# Patient Record
Sex: FEMALE | Race: WHITE | Employment: OTHER | URBAN - NONMETROPOLITAN AREA
[De-identification: names, ages, dates, MRNs, and addresses within clinical notes are randomized per-mention and may not be internally consistent; named-entity substitution may affect disease eponyms.]

---

## 2021-06-28 ENCOUNTER — HOSPITAL ENCOUNTER (OUTPATIENT)
Dept: CT IMAGING | Age: 71
Discharge: HOME | End: 2021-06-28

## 2021-06-28 DIAGNOSIS — Z00.6 EXAMINATION FOR NORMAL COMPARISON FOR CLINICAL RESEARCH: ICD-10-CM

## 2021-06-30 ENCOUNTER — OFFICE VISIT (OUTPATIENT)
Dept: SURGERY | Age: 71
End: 2021-06-30
Payer: MEDICARE

## 2021-06-30 VITALS
OXYGEN SATURATION: 94 % | BODY MASS INDEX: 25.74 KG/M2 | RESPIRATION RATE: 18 BRPM | HEART RATE: 62 BPM | HEIGHT: 64 IN | SYSTOLIC BLOOD PRESSURE: 118 MMHG | TEMPERATURE: 96.5 F | WEIGHT: 150.8 LBS | DIASTOLIC BLOOD PRESSURE: 62 MMHG

## 2021-06-30 DIAGNOSIS — K63.5 POLYP OF SIGMOID COLON, UNSPECIFIED TYPE: Primary | ICD-10-CM

## 2021-06-30 PROCEDURE — 1090F PRES/ABSN URINE INCON ASSESS: CPT | Performed by: SURGERY

## 2021-06-30 PROCEDURE — 3017F COLORECTAL CA SCREEN DOC REV: CPT | Performed by: SURGERY

## 2021-06-30 PROCEDURE — G8417 CALC BMI ABV UP PARAM F/U: HCPCS | Performed by: SURGERY

## 2021-06-30 PROCEDURE — G8427 DOCREV CUR MEDS BY ELIG CLIN: HCPCS | Performed by: SURGERY

## 2021-06-30 PROCEDURE — 1036F TOBACCO NON-USER: CPT | Performed by: SURGERY

## 2021-06-30 PROCEDURE — G8400 PT W/DXA NO RESULTS DOC: HCPCS | Performed by: SURGERY

## 2021-06-30 PROCEDURE — 4040F PNEUMOC VAC/ADMIN/RCVD: CPT | Performed by: SURGERY

## 2021-06-30 PROCEDURE — 99204 OFFICE O/P NEW MOD 45 MIN: CPT | Performed by: SURGERY

## 2021-06-30 PROCEDURE — 1123F ACP DISCUSS/DSCN MKR DOCD: CPT | Performed by: SURGERY

## 2021-06-30 RX ORDER — FAMOTIDINE 20 MG/1
20 TABLET, FILM COATED ORAL DAILY
COMMUNITY

## 2021-06-30 RX ORDER — ESOMEPRAZOLE MAGNESIUM 40 MG/1
40 FOR SUSPENSION ORAL DAILY
COMMUNITY

## 2021-06-30 RX ORDER — METRONIDAZOLE 500 MG/1
TABLET ORAL
Qty: 3 TABLET | Refills: 0 | Status: ON HOLD | OUTPATIENT
Start: 2021-06-30 | End: 2021-07-24 | Stop reason: HOSPADM

## 2021-07-01 ASSESSMENT — ENCOUNTER SYMPTOMS
EYE DISCHARGE: 0
DIARRHEA: 0
SINUS PRESSURE: 0
TROUBLE SWALLOWING: 0
ANAL BLEEDING: 0
COUGH: 0
SHORTNESS OF BREATH: 0
BACK PAIN: 0
COLOR CHANGE: 0
ABDOMINAL PAIN: 0
WHEEZING: 0
CHOKING: 0
RECTAL PAIN: 0
EYE PAIN: 0
EYE ITCHING: 0
ABDOMINAL DISTENTION: 0
RHINORRHEA: 0
VOMITING: 0
APNEA: 0
FACIAL SWELLING: 0
EYE REDNESS: 0
ALLERGIC/IMMUNOLOGIC NEGATIVE: 1
CHEST TIGHTNESS: 0
VOICE CHANGE: 0
NAUSEA: 0
SORE THROAT: 0
BLOOD IN STOOL: 0
CONSTIPATION: 0
PHOTOPHOBIA: 0
STRIDOR: 0

## 2021-07-01 NOTE — PROGRESS NOTES
Murtaza Sarabia (:  1950)     ASSESSMENT:  1. 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 Schedule Veronica William for robotic sigmoid colectomy. 2. She will undergo pre-operative clearance per anesthesia guidelines with risk factors listed under the past medical history diagnosis & problem list.  3. The risks, benefits and alternatives were discussed with Veronica William including non-operative management. The pros and cons of robotic, laparoscopic and open techniques were discussed. All questions answered. She understands and wishes to proceed with surgical intervention. 4. Restrictions discussed with Veronica William and she expresses understanding. 5. She is advised to call back directly if there are further questions/concerns, or if her symptoms worsen prior to surgery. 6.  Obtain previous pathology results prior to surgical intervention    SUBJECTIVE/OBJECTIVE:    Chief Complaint   Patient presents with    Surgical Consult     New patient-referred by Dr Yvonne Adkins colon resection     HPI  Veronica William is a 71-year-old female who presents for initial evaluation secondary to a unresectable recurrent flat polyp in the sigmoid colon. She is underwent 3 colonoscopies since January. The area of concern has been tattooed. Pathology has demonstrated high-grade dysplasia. She is in need of formal resection. She has a history of iron deficiency anemia. Has seen Dr. Kim Hernandes for iron infusions. Long-term GERD. Takes Nexium. 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 Also has had midline open hysterectomy and . Remote history of appendectomy and cholecystectomy. She denies any generalized abdominal pain. No significant bloating/distention. Tolerating diet. Denies nausea or vomiting. No lightheadedness or dizziness. No new chest pain or shortness of breath. History of upper endoscopy demonstrating no acute concerning findings.   The polyp that has been removed is around 1-2 cm in diameter. Denies any significant family history of colorectal disease that she is aware of. Review of Systems   Constitutional: Negative for activity change, appetite change, chills, diaphoresis, fatigue, fever and unexpected weight change. HENT: Negative for congestion, dental problem, drooling, ear discharge, ear pain, facial swelling, hearing loss, mouth sores, nosebleeds, postnasal drip, rhinorrhea, sinus pressure, sneezing, sore throat, tinnitus, trouble swallowing and voice change. Eyes: Negative for photophobia, pain, discharge, redness, itching and visual disturbance. Respiratory: Negative for apnea, cough, choking, chest tightness, shortness of breath, wheezing and stridor. Cardiovascular: Negative for chest pain, palpitations and leg swelling. Gastrointestinal: Negative for abdominal distention, abdominal pain, anal bleeding, blood in stool, constipation, diarrhea, nausea, rectal pain and vomiting. Endocrine: Negative. Genitourinary: Negative for decreased urine volume, difficulty urinating, dyspareunia, dysuria, enuresis, flank pain, frequency, genital sores, hematuria, menstrual problem, pelvic pain, urgency, vaginal bleeding, vaginal discharge and vaginal pain. Musculoskeletal: Negative for arthralgias, back pain, gait problem, joint swelling, myalgias, neck pain and neck stiffness. Skin: Negative for color change, pallor, rash and wound. Allergic/Immunologic: Negative. Neurological: Negative for dizziness, tremors, seizures, syncope, facial asymmetry, speech difficulty, weakness, light-headedness, numbness and headaches. Hematological: Negative for adenopathy. Does not bruise/bleed easily. Psychiatric/Behavioral: Negative for agitation, behavioral problems, confusion, decreased concentration, dysphoric mood, hallucinations, self-injury, sleep disturbance and suicidal ideas.  The patient is not nervous/anxious and is not hyperactive. Past Medical History:   Diagnosis Date    Colon stricture ([address]     polyp    Iron deficiency     sees Dr Dhara Villanueva       Past Surgical History:   Procedure Laterality Date     SECTION      x1    CHOLECYSTECTOMY      COLONOSCOPY  2021    COLONOSCOPY  2021    COLONOSCOPY  2021    [facility]GERY      [facility]    HYSTERECTOMY      age 29 still has ovaries    TONSILLECTOMY      UPPER GASTROINTESTINAL ENDOSCOPY  2021       Current Outpatient Medications   Medication Sig Dispense Refill    esomeprazole Magnesium (NEXIUM) 40 MG PACK Take 40 mg by mouth daily      famotidine (PEPCID) 20 MG tablet Take 20 mg by mouth daily      metroNIDAZOLE (FLAGYL) 500 MG tablet Take one tablet at 4pm, one tablet at 5pm and one tablet at 11pm the day prior to surgery. 3 tablet 0     No current facility-administered medications for this visit. Allergies   Allergen Reactions    Other      Boniva-rash       Family History   Problem Relation Age of Onset    Cancer Mother         lung    No Known Problems Father     Cancer Brother         bone       Social History     Socioeconomic History    Marital status:      Spouse name: Not on file    Number of children: Not on file    Years of education: Not on file    Highest education level: Not on file   Occupational History    Not on file   Tobacco Use    Smoking status: Never Smoker    Smokeless tobacco: Never Used   Substance and Sexual Activity    Alcohol use: Yes     Comment: beer occ.     Drug use: Never    Sexual activity: Not on file   Other Topics Concern    Not on file   Social History Narrative    Not on file     Social Determinants of Health     Financial Resource Strain:     Difficulty of Paying Living Expenses:    Food Insecurity:     Worried About Running Out of Food in the Last Year:     [address] in the Last Year:    Transportation Needs:     Lack of Transportation (Medical):  Lack of Transportation (Non-Medical):    Physical Activity:     Days of Exercise per Week:     Minutes of Exercise per Session:    Stress:     Feeling of Stress :    Social Connections:     Frequency of Communication with Friends and Family:     Frequency of Social Gatherings with Friends and Family:     Attends [employer] Services:     Active Member of Clubs or Organizations:     Attends Club or Organization Meetings:     Marital Status:    Intimate Partner Violence:     Fear of Current or Ex-Partner:     Emotionally Abused:     Physically Abused:     Sexually Abused:      Vitals:    06/30/21 0749   BP: 118/62   Site: Right Upper Arm   Position: Sitting   Cuff Size: Medium Adult   Pulse: 62   Resp: 18   Temp: 96.5 °F (35.8 °C)   TempSrc: Temporal   SpO2: 94%   Weight: 150 lb 12.8 oz (68.4 kg)   Height: 5' 4\" (1.626 m)     Body mass index is 25.88 kg/m². Wt Readings from Last 3 Encounters:   06/30/21 150 lb 12.8 oz (68.4 kg)     Physical Exam  Vitals reviewed. Constitutional:       General: She is not in acute distress. Appearance: She is well-developed. She is not diaphoretic. HENT:      Head: Normocephalic and atraumatic. Right Ear: External ear normal.      Left Ear: External ear normal.      Nose: Nose normal.   Eyes:      General: No scleral icterus. Right eye: No discharge. Left eye: No discharge. Conjunctiva/sclera: Conjunctivae normal.   Cardiovascular:      Rate and Rhythm: Normal rate and regular rhythm. Heart sounds: Normal heart sounds. Pulmonary:      Effort: Pulmonary effort is normal. No respiratory distress. Breath sounds: Normal breath sounds. No wheezing or rales. Chest:      Chest wall: No tenderness. Abdominal:      General: Bowel sounds are normal. There is no distension. Palpations: Abdomen is soft. There is no mass. Tenderness: There is no abdominal tenderness. There is no guarding or rebound.

## 2021-07-08 ENCOUNTER — TELEPHONE (OUTPATIENT)
Dept: SURGERY | Age: 71
End: 2021-07-08

## 2021-07-16 NOTE — H&P
Olaf Moctezuma (:  1950)      ASSESSMENT:  1. 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 Schedule Linh for robotic sigmoid colectomy. 2. She will undergo pre-operative clearance per anesthesia guidelines with risk factors listed under the past medical history diagnosis & problem list.  3. The risks, benefits and alternatives were discussed with Gerhard Magdaleno including non-operative management. The pros and cons of robotic, laparoscopic and open techniques were discussed. All questions answered. She understands and wishes to proceed with surgical intervention. 4. Restrictions discussed with Gerhard Magdaleno and she expresses understanding. 5. She is advised to call back directly if there are further questions/concerns, or if her symptoms worsen prior to surgery. 6.  Obtain previous pathology results prior to surgical intervention     SUBJECTIVE/OBJECTIVE:          Chief Complaint   Patient presents with    Surgical Consult       New patient-referred by Dr Singh Flight colon resection      HPI  Gerhard Kingstonnani is a 33-year-old female who presents for initial evaluation secondary to a unresectable recurrent flat polyp in the sigmoid colon. She is underwent 3 colonoscopies since January. The area of concern has been tattooed. Pathology has demonstrated high-grade dysplasia. She is in need of formal resection. She has a history of iron deficiency anemia. Has seen Dr. Jose Malloy for iron infusions. Long-term GERD. Takes Nexium. 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 Also has had midline open hysterectomy and . Remote history of appendectomy and cholecystectomy. She denies any generalized abdominal pain. No significant bloating/distention. Tolerating diet. Denies nausea or vomiting. No lightheadedness or dizziness. No new chest pain or shortness of breath. History of upper endoscopy demonstrating no acute concerning findings. The polyp that has been removed is around 1-2 cm in diameter. Denies any significant family history of colorectal disease that she is aware of.     Review of Systems   Constitutional: Negative for activity change, appetite change, chills, diaphoresis, fatigue, fever and unexpected weight change. HENT: Negative for congestion, dental problem, drooling, ear discharge, ear pain, facial swelling, hearing loss, mouth sores, nosebleeds, postnasal drip, rhinorrhea, sinus pressure, sneezing, sore throat, tinnitus, trouble swallowing and voice change. Eyes: Negative for photophobia, pain, discharge, redness, itching and visual disturbance. Respiratory: Negative for apnea, cough, choking, chest tightness, shortness of breath, wheezing and stridor. Cardiovascular: Negative for chest pain, palpitations and leg swelling. Gastrointestinal: Negative for abdominal distention, abdominal pain, anal bleeding, blood in stool, constipation, diarrhea, nausea, rectal pain and vomiting. Endocrine: Negative. Genitourinary: Negative for decreased urine volume, difficulty urinating, dyspareunia, dysuria, enuresis, flank pain, frequency, genital sores, hematuria, menstrual problem, pelvic pain, urgency, vaginal bleeding, vaginal discharge and vaginal pain. Musculoskeletal: Negative for arthralgias, back pain, gait problem, joint swelling, myalgias, neck pain and neck stiffness. Skin: Negative for color change, pallor, rash and wound. Allergic/Immunologic: Negative. Neurological: Negative for dizziness, tremors, seizures, syncope, facial asymmetry, speech difficulty, weakness, light-headedness, numbness and headaches. Hematological: Negative for adenopathy. Does not bruise/bleed easily. Psychiatric/Behavioral: Negative for agitation, behavioral problems, confusion, decreased concentration, dysphoric mood, hallucinations, self-injury, sleep disturbance and suicidal ideas.  The patient is not nervous/anxious and is not hyperactive.          Past Medical History        Past Medical History:   Diagnosis Date    Colon stricture (HCC)       polyp    Iron deficiency       sees Dr Matthew Avery            Past Surgical History         Past Surgical History:   Procedure Laterality Date     SECTION         x1    CHOLECYSTECTOMY        COLONOSCOPY   2021    COLONOSCOPY   2021    COLONOSCOPY   2021    GASTRIC [facility]PASS SURGERY        [facility]    HYSTERECTOMY         age 29 still has ovaries    TONSILLECTOMY        UPPER GASTROINTESTINAL ENDOSCOPY   2021            Current Facility-Administered Medications          Current Outpatient Medications   Medication Sig Dispense Refill    esomeprazole Magnesium (NEXIUM) 40 MG PACK Take 40 mg by mouth daily        famotidine (PEPCID) 20 MG tablet Take 20 mg by mouth daily        metroNIDAZOLE (FLAGYL) 500 MG tablet Take one tablet at 4pm, one tablet at 5pm and one tablet at 11pm the day prior to surgery. 3 tablet 0      No current facility-administered medications for this visit.                  Allergies   Allergen Reactions    Other         Boniva-rash         Family History         Family History   Problem Relation Age of Onset    Cancer Mother           lung    No Known Problems Father      Cancer Brother           bone            Social History               Socioeconomic History    Marital status:        Spouse name: Not on file    Number of children: Not on file    Years of education: Not on file    Highest education level: Not on file   Occupational History    Not on file   Tobacco Use    Smoking status: Never Smoker    Smokeless tobacco: Never Used   Substance and Sexual Activity    Alcohol use: Yes       Comment: beer occ.     Drug use: Never    Sexual activity: Not on file   Other Topics Concern    Not on file   Social History Narrative    Not on file      Social Determinants of Health          Financial Resource Strain:     Difficulty of Paying Living Expenses:    Food Insecurity:     Worried About Running Out of Food in the Last Year:     [address] in the Last Year:    Transportation Needs:     Lack of Transportation (Medical):  Lack of Transportation (Non-Medical):    Physical Activity:     Days of Exercise per Week:     Minutes of Exercise per Session:    Stress:     Feeling of Stress :    Social Connections:     Frequency of Communication with Friends and Family:     Frequency of Social Gatherings with Friends and Family:     Attends Spiritism Services:     Active Member of Clubs or Organizations:     Attends Club or Organization Meetings:     Marital Status:    Intimate Partner Violence:     Fear of Current or Ex-Partner:     Emotionally Abused:     Physically Abused:     Sexually Abused:          Vitals       Vitals:     06/30/21 0749   BP: 118/62   Site: Right Upper Arm   Position: Sitting   Cuff Size: Medium Adult   Pulse: 62   Resp: 18   Temp: 96.5 °F (35.8 °C)   TempSrc: Temporal   SpO2: 94%   Weight: 150 lb 12.8 oz (68.4 kg)   Height: 5' 4\" (1.626 m)         Body mass index is 25.88 kg/m².         Wt Readings from Last 3 Encounters:   06/30/21 150 lb 12.8 oz (68.4 kg)      Physical Exam  Vitals reviewed. Constitutional:       General: She is not in acute distress. Appearance: She is well-developed. She is not diaphoretic. HENT:      Head: Normocephalic and atraumatic. Right Ear: External ear normal.      Left Ear: External ear normal.      Nose: Nose normal.   Eyes:      General: No scleral icterus. Right eye: No discharge. Left eye: No discharge. Conjunctiva/sclera: Conjunctivae normal.   Cardiovascular:      Rate and Rhythm: Normal rate and regular rhythm. Heart sounds: Normal heart sounds. Pulmonary:      Effort: Pulmonary effort is normal. No respiratory distress. Breath sounds: Normal breath sounds. No wheezing or rales.    Chest: Chest wall: No tenderness. Abdominal:      General: Bowel sounds are normal. There is no distension. Palpations: Abdomen is soft. There is no mass. Tenderness: There is no abdominal tenderness. There is no guarding or rebound. Musculoskeletal:         General: No tenderness. Normal range of motion. Cervical back: Normal range of motion and neck supple. Skin:     General: Skin is warm and dry. Coloration: Skin is not pale. Findings: No erythema or rash. Neurological:      Mental Status: She is alert and oriented to person, place, and time. Cranial Nerves: No cranial nerve deficit. Psychiatric:         Behavior: Behavior normal.         Thought Content:  Thought content normal.         Judgment: Judgment normal.      No results found for: NA, K, CL, CO2  No results found for: CREATININE  No results found for: WBC, HGB, HCT, MCV, PLT  No results found for: ALT, AST, GGT, ALKPHOS, BILITOT                                         There is no problem list on file for this patient.     An electronic signature was used to authenticate this note.     --Chantale Oleary MD

## 2021-07-19 ENCOUNTER — ANESTHESIA EVENT (OUTPATIENT)
Dept: OPERATING ROOM | Age: 71
DRG: 331 | End: 2021-07-19
Payer: MEDICARE

## 2021-07-20 ENCOUNTER — ANESTHESIA (OUTPATIENT)
Dept: OPERATING ROOM | Age: 71
DRG: 331 | End: 2021-07-20
Payer: MEDICARE

## 2021-07-20 ENCOUNTER — HOSPITAL ENCOUNTER (INPATIENT)
Age: 71
LOS: 4 days | Discharge: HOME | DRG: 331 | End: 2021-07-24
Attending: SURGERY | Admitting: SURGERY
Payer: MEDICARE

## 2021-07-20 VITALS — OXYGEN SATURATION: 100 % | TEMPERATURE: 94.3 F | SYSTOLIC BLOOD PRESSURE: 135 MMHG | DIASTOLIC BLOOD PRESSURE: 65 MMHG

## 2021-07-20 DIAGNOSIS — K63.5 POLYP OF SIGMOID COLON, UNSPECIFIED TYPE: Primary | ICD-10-CM

## 2021-07-20 LAB
ABO: NORMAL
ANTIBODY SCREEN: NORMAL
BACTERIA: ABNORMAL
BILIRUBIN URINE: NEGATIVE
BLOOD, URINE: NEGATIVE
CASTS: ABNORMAL /LPF
CASTS: ABNORMAL /LPF
CHARACTER, URINE: CLEAR
COLOR: YELLOW
CREAT SERPL-MCNC: 0.5 MG/DL (ref 0.4–1.2)
CRYSTALS: ABNORMAL
EPITHELIAL CELLS, UA: ABNORMAL /HPF
GFR SERPL CREATININE-BSD FRML MDRD: > 90 ML/MIN/1.73M2
GLUCOSE, URINE: NEGATIVE MG/DL
KETONES, URINE: 15
LEUKOCYTE EST, POC: ABNORMAL
MISCELLANEOUS LAB TEST RESULT: ABNORMAL
NITRITE, URINE: NEGATIVE
PH UA: 5.5 (ref 5–9)
PROTEIN UA: NEGATIVE MG/DL
RBC URINE: ABNORMAL /HPF
RENAL EPITHELIAL, UA: ABNORMAL
RH FACTOR: NORMAL
SPECIFIC GRAVITY UA: 1.01 (ref 1–1.03)
UROBILINOGEN, URINE: 0.2 EU/DL (ref 0–1)
WBC UA: ABNORMAL /HPF
YEAST: ABNORMAL

## 2021-07-20 PROCEDURE — 87086 URINE CULTURE/COLONY COUNT: CPT

## 2021-07-20 PROCEDURE — 7100000010 HC PHASE II RECOVERY - FIRST 15 MIN: Performed by: SURGERY

## 2021-07-20 PROCEDURE — 0DTG4ZZ RESECTION OF LEFT LARGE INTESTINE, PERCUTANEOUS ENDOSCOPIC APPROACH: ICD-10-PCS | Performed by: SURGERY

## 2021-07-20 PROCEDURE — 82565 ASSAY OF CREATININE: CPT

## 2021-07-20 PROCEDURE — 3600000009 HC SURGERY ROBOT BASE: Performed by: SURGERY

## 2021-07-20 PROCEDURE — 2580000003 HC RX 258: Performed by: SURGERY

## 2021-07-20 PROCEDURE — 3700000001 HC ADD 15 MINUTES (ANESTHESIA): Performed by: SURGERY

## 2021-07-20 PROCEDURE — 6370000000 HC RX 637 (ALT 250 FOR IP): Performed by: SURGERY

## 2021-07-20 PROCEDURE — 8E0W4CZ ROBOTIC ASSISTED PROCEDURE OF TRUNK REGION, PERCUTANEOUS ENDOSCOPIC APPROACH: ICD-10-PCS | Performed by: SURGERY

## 2021-07-20 PROCEDURE — 2500000003 HC RX 250 WO HCPCS: Performed by: NURSE ANESTHETIST, CERTIFIED REGISTERED

## 2021-07-20 PROCEDURE — 6360000002 HC RX W HCPCS: Performed by: NURSE ANESTHETIST, CERTIFIED REGISTERED

## 2021-07-20 PROCEDURE — 0DTN4ZZ RESECTION OF SIGMOID COLON, PERCUTANEOUS ENDOSCOPIC APPROACH: ICD-10-PCS | Performed by: SURGERY

## 2021-07-20 PROCEDURE — 1200000000 HC SEMI PRIVATE

## 2021-07-20 PROCEDURE — 86901 BLOOD TYPING SEROLOGIC RH(D): CPT

## 2021-07-20 PROCEDURE — 2500000003 HC RX 250 WO HCPCS: Performed by: SURGERY

## 2021-07-20 PROCEDURE — 7100000011 HC PHASE II RECOVERY - ADDTL 15 MIN: Performed by: SURGERY

## 2021-07-20 PROCEDURE — 3600000019 HC SURGERY ROBOT ADDTL 15MIN: Performed by: SURGERY

## 2021-07-20 PROCEDURE — 86900 BLOOD TYPING SEROLOGIC ABO: CPT

## 2021-07-20 PROCEDURE — 81001 URINALYSIS AUTO W/SCOPE: CPT

## 2021-07-20 PROCEDURE — S2900 ROBOTIC SURGICAL SYSTEM: HCPCS | Performed by: SURGERY

## 2021-07-20 PROCEDURE — 2500000003 HC RX 250 WO HCPCS: Performed by: ANESTHESIOLOGY

## 2021-07-20 PROCEDURE — 2580000003 HC RX 258

## 2021-07-20 PROCEDURE — 2709999900 HC NON-CHARGEABLE SUPPLY: Performed by: SURGERY

## 2021-07-20 PROCEDURE — 7100000000 HC PACU RECOVERY - FIRST 15 MIN: Performed by: SURGERY

## 2021-07-20 PROCEDURE — 7100000001 HC PACU RECOVERY - ADDTL 15 MIN: Performed by: SURGERY

## 2021-07-20 PROCEDURE — 36415 COLL VENOUS BLD VENIPUNCTURE: CPT

## 2021-07-20 PROCEDURE — 6360000002 HC RX W HCPCS: Performed by: SURGERY

## 2021-07-20 PROCEDURE — 88307 TISSUE EXAM BY PATHOLOGIST: CPT

## 2021-07-20 PROCEDURE — 3700000000 HC ANESTHESIA ATTENDED CARE: Performed by: SURGERY

## 2021-07-20 PROCEDURE — 6360000002 HC RX W HCPCS: Performed by: ANESTHESIOLOGY

## 2021-07-20 PROCEDURE — 86850 RBC ANTIBODY SCREEN: CPT

## 2021-07-20 PROCEDURE — 2720000010 HC SURG SUPPLY STERILE: Performed by: SURGERY

## 2021-07-20 PROCEDURE — 44204 LAPARO PARTIAL COLECTOMY: CPT | Performed by: SURGERY

## 2021-07-20 PROCEDURE — 6360000002 HC RX W HCPCS

## 2021-07-20 RX ORDER — ONDANSETRON 2 MG/ML
4 INJECTION INTRAMUSCULAR; INTRAVENOUS EVERY 4 HOURS PRN
Status: DISCONTINUED | OUTPATIENT
Start: 2021-07-20 | End: 2021-07-24 | Stop reason: HOSPADM

## 2021-07-20 RX ORDER — ALVIMOPAN 12 MG/1
12 CAPSULE ORAL ONCE
Status: DISCONTINUED | OUTPATIENT
Start: 2021-07-20 | End: 2021-07-20 | Stop reason: CLARIF

## 2021-07-20 RX ORDER — SODIUM CHLORIDE 9 MG/ML
25 INJECTION, SOLUTION INTRAVENOUS PRN
Status: DISCONTINUED | OUTPATIENT
Start: 2021-07-20 | End: 2021-07-24 | Stop reason: HOSPADM

## 2021-07-20 RX ORDER — ONDANSETRON 4 MG/1
4 TABLET, ORALLY DISINTEGRATING ORAL EVERY 8 HOURS PRN
Status: DISCONTINUED | OUTPATIENT
Start: 2021-07-20 | End: 2021-07-24 | Stop reason: HOSPADM

## 2021-07-20 RX ORDER — SODIUM CHLORIDE 0.9 % (FLUSH) 0.9 %
5-40 SYRINGE (ML) INJECTION EVERY 12 HOURS SCHEDULED
Status: DISCONTINUED | OUTPATIENT
Start: 2021-07-20 | End: 2021-07-24 | Stop reason: HOSPADM

## 2021-07-20 RX ORDER — MORPHINE SULFATE 2 MG/ML
2 INJECTION, SOLUTION INTRAMUSCULAR; INTRAVENOUS
Status: DISCONTINUED | OUTPATIENT
Start: 2021-07-20 | End: 2021-07-24 | Stop reason: HOSPADM

## 2021-07-20 RX ORDER — HYDROCODONE BITARTRATE AND ACETAMINOPHEN 5; 325 MG/1; MG/1
1 TABLET ORAL EVERY 4 HOURS PRN
Status: DISCONTINUED | OUTPATIENT
Start: 2021-07-20 | End: 2021-07-24 | Stop reason: HOSPADM

## 2021-07-20 RX ORDER — ALVIMOPAN 12 MG/1
12 CAPSULE ORAL 2 TIMES DAILY
Status: DISCONTINUED | OUTPATIENT
Start: 2021-07-20 | End: 2021-07-20 | Stop reason: CLARIF

## 2021-07-20 RX ORDER — HYDROCODONE BITARTRATE AND ACETAMINOPHEN 5; 325 MG/1; MG/1
2 TABLET ORAL EVERY 4 HOURS PRN
Status: DISCONTINUED | OUTPATIENT
Start: 2021-07-20 | End: 2021-07-24 | Stop reason: HOSPADM

## 2021-07-20 RX ORDER — FENTANYL CITRATE 50 UG/ML
INJECTION, SOLUTION INTRAMUSCULAR; INTRAVENOUS PRN
Status: DISCONTINUED | OUTPATIENT
Start: 2021-07-20 | End: 2021-07-20 | Stop reason: SDUPTHER

## 2021-07-20 RX ORDER — LABETALOL 20 MG/4 ML (5 MG/ML) INTRAVENOUS SYRINGE
10 EVERY 10 MIN PRN
Status: DISCONTINUED | OUTPATIENT
Start: 2021-07-20 | End: 2021-07-20 | Stop reason: HOSPADM

## 2021-07-20 RX ORDER — MORPHINE SULFATE 2 MG/ML
4 INJECTION, SOLUTION INTRAMUSCULAR; INTRAVENOUS
Status: DISCONTINUED | OUTPATIENT
Start: 2021-07-20 | End: 2021-07-24 | Stop reason: HOSPADM

## 2021-07-20 RX ORDER — DEXAMETHASONE SODIUM PHOSPHATE 4 MG/ML
INJECTION, SOLUTION INTRA-ARTICULAR; INTRALESIONAL; INTRAMUSCULAR; INTRAVENOUS; SOFT TISSUE PRN
Status: DISCONTINUED | OUTPATIENT
Start: 2021-07-20 | End: 2021-07-20 | Stop reason: SDUPTHER

## 2021-07-20 RX ORDER — CALCIUM CARBONATE 500(1250)
500 TABLET ORAL DAILY
COMMUNITY

## 2021-07-20 RX ORDER — HYDROMORPHONE HCL 110MG/55ML
PATIENT CONTROLLED ANALGESIA SYRINGE INTRAVENOUS PRN
Status: DISCONTINUED | OUTPATIENT
Start: 2021-07-20 | End: 2021-07-20 | Stop reason: SDUPTHER

## 2021-07-20 RX ORDER — MORPHINE SULFATE 2 MG/ML
INJECTION, SOLUTION INTRAMUSCULAR; INTRAVENOUS
Status: DISPENSED
Start: 2021-07-20 | End: 2021-07-20

## 2021-07-20 RX ORDER — BUPIVACAINE HYDROCHLORIDE 5 MG/ML
INJECTION, SOLUTION PERINEURAL PRN
Status: DISCONTINUED | OUTPATIENT
Start: 2021-07-20 | End: 2021-07-20 | Stop reason: HOSPADM

## 2021-07-20 RX ORDER — SODIUM CHLORIDE 0.9 % (FLUSH) 0.9 %
5-40 SYRINGE (ML) INJECTION PRN
Status: DISCONTINUED | OUTPATIENT
Start: 2021-07-20 | End: 2021-07-24 | Stop reason: HOSPADM

## 2021-07-20 RX ORDER — LIDOCAINE HYDROCHLORIDE 20 MG/ML
INJECTION, SOLUTION INFILTRATION; PERINEURAL PRN
Status: DISCONTINUED | OUTPATIENT
Start: 2021-07-20 | End: 2021-07-20 | Stop reason: SDUPTHER

## 2021-07-20 RX ORDER — SODIUM CHLORIDE 9 MG/ML
INJECTION, SOLUTION INTRAVENOUS CONTINUOUS
Status: DISCONTINUED | OUTPATIENT
Start: 2021-07-20 | End: 2021-07-24 | Stop reason: HOSPADM

## 2021-07-20 RX ORDER — PROMETHAZINE HYDROCHLORIDE 25 MG/ML
12.5 INJECTION, SOLUTION INTRAMUSCULAR; INTRAVENOUS
Status: DISCONTINUED | OUTPATIENT
Start: 2021-07-20 | End: 2021-07-20 | Stop reason: HOSPADM

## 2021-07-20 RX ORDER — SODIUM CHLORIDE 9 MG/ML
INJECTION, SOLUTION INTRAVENOUS CONTINUOUS
Status: DISCONTINUED | OUTPATIENT
Start: 2021-07-20 | End: 2021-07-20

## 2021-07-20 RX ORDER — ROCURONIUM BROMIDE 10 MG/ML
INJECTION, SOLUTION INTRAVENOUS PRN
Status: DISCONTINUED | OUTPATIENT
Start: 2021-07-20 | End: 2021-07-20 | Stop reason: SDUPTHER

## 2021-07-20 RX ORDER — ONDANSETRON 2 MG/ML
INJECTION INTRAMUSCULAR; INTRAVENOUS PRN
Status: DISCONTINUED | OUTPATIENT
Start: 2021-07-20 | End: 2021-07-20 | Stop reason: SDUPTHER

## 2021-07-20 RX ORDER — KETOROLAC TROMETHAMINE 30 MG/ML
INJECTION, SOLUTION INTRAMUSCULAR; INTRAVENOUS PRN
Status: DISCONTINUED | OUTPATIENT
Start: 2021-07-20 | End: 2021-07-20 | Stop reason: SDUPTHER

## 2021-07-20 RX ORDER — ONDANSETRON 2 MG/ML
4 INJECTION INTRAMUSCULAR; INTRAVENOUS EVERY 6 HOURS PRN
Status: DISCONTINUED | OUTPATIENT
Start: 2021-07-20 | End: 2021-07-24 | Stop reason: HOSPADM

## 2021-07-20 RX ORDER — PROPOFOL 10 MG/ML
INJECTION, EMULSION INTRAVENOUS PRN
Status: DISCONTINUED | OUTPATIENT
Start: 2021-07-20 | End: 2021-07-20 | Stop reason: SDUPTHER

## 2021-07-20 RX ORDER — FENTANYL CITRATE 50 UG/ML
50 INJECTION, SOLUTION INTRAMUSCULAR; INTRAVENOUS EVERY 5 MIN PRN
Status: DISCONTINUED | OUTPATIENT
Start: 2021-07-20 | End: 2021-07-20 | Stop reason: HOSPADM

## 2021-07-20 RX ADMIN — MORPHINE SULFATE 2 MG: 2 INJECTION, SOLUTION INTRAMUSCULAR; INTRAVENOUS at 11:30

## 2021-07-20 RX ADMIN — SODIUM CHLORIDE: 9 INJECTION, SOLUTION INTRAVENOUS at 23:27

## 2021-07-20 RX ADMIN — ONDANSETRON HYDROCHLORIDE 4 MG: 4 INJECTION, SOLUTION INTRAMUSCULAR; INTRAVENOUS at 08:48

## 2021-07-20 RX ADMIN — HYDROMORPHONE HYDROCHLORIDE 1 MG: 2 INJECTION INTRAMUSCULAR; INTRAVENOUS; SUBCUTANEOUS at 09:32

## 2021-07-20 RX ADMIN — HYDROMORPHONE HYDROCHLORIDE 1 MG: 2 INJECTION INTRAMUSCULAR; INTRAVENOUS; SUBCUTANEOUS at 08:00

## 2021-07-20 RX ADMIN — FENTANYL CITRATE 50 MCG: 50 INJECTION, SOLUTION INTRAMUSCULAR; INTRAVENOUS at 09:54

## 2021-07-20 RX ADMIN — SUGAMMADEX 150 MG: 100 INJECTION, SOLUTION INTRAVENOUS at 09:17

## 2021-07-20 RX ADMIN — LABETALOL 20 MG/4 ML (5 MG/ML) INTRAVENOUS SYRINGE 10 MG: at 09:58

## 2021-07-20 RX ADMIN — NALOXEGOL OXALATE 12.5 MG: 12.5 TABLET, FILM COATED ORAL at 07:18

## 2021-07-20 RX ADMIN — CEFOXITIN 2000 MG: 2 INJECTION, POWDER, FOR SOLUTION INTRAVENOUS at 07:40

## 2021-07-20 RX ADMIN — LIDOCAINE HYDROCHLORIDE 100 MG: 20 INJECTION, SOLUTION INFILTRATION; PERINEURAL at 07:32

## 2021-07-20 RX ADMIN — HYDROCODONE BITARTRATE AND ACETAMINOPHEN 1 TABLET: 5; 325 TABLET ORAL at 15:20

## 2021-07-20 RX ADMIN — PROPOFOL 100 MG: 10 INJECTION, EMULSION INTRAVENOUS at 07:41

## 2021-07-20 RX ADMIN — KETOROLAC TROMETHAMINE 15 MG: 30 INJECTION, SOLUTION INTRAMUSCULAR; INTRAVENOUS at 09:20

## 2021-07-20 RX ADMIN — FENTANYL CITRATE 50 MCG: 50 INJECTION, SOLUTION INTRAMUSCULAR; INTRAVENOUS at 07:35

## 2021-07-20 RX ADMIN — SODIUM CHLORIDE: 9 INJECTION, SOLUTION INTRAVENOUS at 06:49

## 2021-07-20 RX ADMIN — ROCURONIUM BROMIDE 20 MG: 10 INJECTION INTRAVENOUS at 08:05

## 2021-07-20 RX ADMIN — HYDROCODONE BITARTRATE AND ACETAMINOPHEN 2 TABLET: 5; 325 TABLET ORAL at 21:15

## 2021-07-20 RX ADMIN — ROCURONIUM BROMIDE 50 MG: 10 INJECTION INTRAVENOUS at 07:32

## 2021-07-20 RX ADMIN — FENTANYL CITRATE 50 MCG: 50 INJECTION, SOLUTION INTRAMUSCULAR; INTRAVENOUS at 10:07

## 2021-07-20 RX ADMIN — PROPOFOL 50 MG: 10 INJECTION, EMULSION INTRAVENOUS at 07:35

## 2021-07-20 RX ADMIN — PROPOFOL 100 MG: 10 INJECTION, EMULSION INTRAVENOUS at 08:02

## 2021-07-20 RX ADMIN — PROPOFOL 150 MG: 10 INJECTION, EMULSION INTRAVENOUS at 07:32

## 2021-07-20 RX ADMIN — SODIUM CHLORIDE: 9 INJECTION, SOLUTION INTRAVENOUS at 15:21

## 2021-07-20 RX ADMIN — FENTANYL CITRATE 50 MCG: 50 INJECTION, SOLUTION INTRAMUSCULAR; INTRAVENOUS at 07:32

## 2021-07-20 RX ADMIN — PROPOFOL 100 MG: 10 INJECTION, EMULSION INTRAVENOUS at 08:05

## 2021-07-20 RX ADMIN — DEXAMETHASONE SODIUM PHOSPHATE 5 MG: 4 INJECTION, SOLUTION INTRAMUSCULAR; INTRAVENOUS at 07:54

## 2021-07-20 ASSESSMENT — PAIN - FUNCTIONAL ASSESSMENT: PAIN_FUNCTIONAL_ASSESSMENT: 0-10

## 2021-07-20 ASSESSMENT — PULMONARY FUNCTION TESTS
PIF_VALUE: 23
PIF_VALUE: 15
PIF_VALUE: 25
PIF_VALUE: 25
PIF_VALUE: 17
PIF_VALUE: 16
PIF_VALUE: 0
PIF_VALUE: 2
PIF_VALUE: 15
PIF_VALUE: 24
PIF_VALUE: 25
PIF_VALUE: 15
PIF_VALUE: 24
PIF_VALUE: 25
PIF_VALUE: 15
PIF_VALUE: 26
PIF_VALUE: 17
PIF_VALUE: 25
PIF_VALUE: 15
PIF_VALUE: 21
PIF_VALUE: 23
PIF_VALUE: 15
PIF_VALUE: 24
PIF_VALUE: 21
PIF_VALUE: 23
PIF_VALUE: 25
PIF_VALUE: 17
PIF_VALUE: 25
PIF_VALUE: 25
PIF_VALUE: 21
PIF_VALUE: 2
PIF_VALUE: 1
PIF_VALUE: 23
PIF_VALUE: 25
PIF_VALUE: 26
PIF_VALUE: 1
PIF_VALUE: 15
PIF_VALUE: 23
PIF_VALUE: 26
PIF_VALUE: 16
PIF_VALUE: 25
PIF_VALUE: 19
PIF_VALUE: 2
PIF_VALUE: 24
PIF_VALUE: 23
PIF_VALUE: 15
PIF_VALUE: 16
PIF_VALUE: 25
PIF_VALUE: 24
PIF_VALUE: 24
PIF_VALUE: 15
PIF_VALUE: 22
PIF_VALUE: 23
PIF_VALUE: 15
PIF_VALUE: 24
PIF_VALUE: 23
PIF_VALUE: 26
PIF_VALUE: 15
PIF_VALUE: 25
PIF_VALUE: 15
PIF_VALUE: 23
PIF_VALUE: 24
PIF_VALUE: 26
PIF_VALUE: 17
PIF_VALUE: 21
PIF_VALUE: 16
PIF_VALUE: 25
PIF_VALUE: 21
PIF_VALUE: 23
PIF_VALUE: 25
PIF_VALUE: 15
PIF_VALUE: 23
PIF_VALUE: 22
PIF_VALUE: 1
PIF_VALUE: 26
PIF_VALUE: 26
PIF_VALUE: 23
PIF_VALUE: 16
PIF_VALUE: 26
PIF_VALUE: 24
PIF_VALUE: 15
PIF_VALUE: 24
PIF_VALUE: 17
PIF_VALUE: 21
PIF_VALUE: 21
PIF_VALUE: 15
PIF_VALUE: 23
PIF_VALUE: 26
PIF_VALUE: 1
PIF_VALUE: 15
PIF_VALUE: 16
PIF_VALUE: 15
PIF_VALUE: 24
PIF_VALUE: 23
PIF_VALUE: 25
PIF_VALUE: 15
PIF_VALUE: 7
PIF_VALUE: 25
PIF_VALUE: 23
PIF_VALUE: 23
PIF_VALUE: 25
PIF_VALUE: 1
PIF_VALUE: 5
PIF_VALUE: 15
PIF_VALUE: 16
PIF_VALUE: 23
PIF_VALUE: 0
PIF_VALUE: 25
PIF_VALUE: 23
PIF_VALUE: 24
PIF_VALUE: 15
PIF_VALUE: 21
PIF_VALUE: 26
PIF_VALUE: 25
PIF_VALUE: 17
PIF_VALUE: 26
PIF_VALUE: 15
PIF_VALUE: 26
PIF_VALUE: 25
PIF_VALUE: 0
PIF_VALUE: 2
PIF_VALUE: 24
PIF_VALUE: 25
PIF_VALUE: 1
PIF_VALUE: 15
PIF_VALUE: 25
PIF_VALUE: 0
PIF_VALUE: 16
PIF_VALUE: 15
PIF_VALUE: 23

## 2021-07-20 ASSESSMENT — PAIN DESCRIPTION - LOCATION
LOCATION: ABDOMEN

## 2021-07-20 ASSESSMENT — PAIN DESCRIPTION - FREQUENCY
FREQUENCY: CONTINUOUS

## 2021-07-20 ASSESSMENT — PAIN SCALES - GENERAL
PAINLEVEL_OUTOF10: 7
PAINLEVEL_OUTOF10: 0
PAINLEVEL_OUTOF10: 2
PAINLEVEL_OUTOF10: 6
PAINLEVEL_OUTOF10: 4
PAINLEVEL_OUTOF10: 0
PAINLEVEL_OUTOF10: 3
PAINLEVEL_OUTOF10: 4
PAINLEVEL_OUTOF10: 0
PAINLEVEL_OUTOF10: 6
PAINLEVEL_OUTOF10: 0
PAINLEVEL_OUTOF10: 5
PAINLEVEL_OUTOF10: 0

## 2021-07-20 ASSESSMENT — PAIN DESCRIPTION - DESCRIPTORS
DESCRIPTORS: ACHING;SORE
DESCRIPTORS: ACHING;SORE
DESCRIPTORS: ACHING;CONSTANT;SHARP;SHOOTING
DESCRIPTORS: ACHING;DISCOMFORT

## 2021-07-20 ASSESSMENT — PAIN DESCRIPTION - ONSET
ONSET: ON-GOING
ONSET: SUDDEN

## 2021-07-20 ASSESSMENT — PAIN DESCRIPTION - PAIN TYPE
TYPE: SURGICAL PAIN

## 2021-07-20 ASSESSMENT — PAIN DESCRIPTION - ORIENTATION
ORIENTATION: MID;RIGHT
ORIENTATION: MID;RIGHT

## 2021-07-20 ASSESSMENT — PAIN DESCRIPTION - PROGRESSION
CLINICAL_PROGRESSION: GRADUALLY IMPROVING

## 2021-07-20 NOTE — PROGRESS NOTES
Oriented to sds 20        Refuses flu and pneumonia vaccine. Family updated to stay in room. Informed family to take belonging with them if they leave. Keep nothing of value in room unattended. pt was asked and agreed to first name and last initial being put on white boards. Allergy and fall risks applied. SCD Applied to patient. Warming blanket applied to patient. Pt denies any abuse or thoughts of suicide.

## 2021-07-20 NOTE — BRIEF OP NOTE
Brief Postoperative Note      Patient: Khadar Rodriguez  YOB: 1950  MRN: [id number]    Date of Procedure: 7/20/2021    Pre-Op Diagnosis: SIGMOID COLON POLYP    Post-Op Diagnosis: Same       Procedure(s):  ROBOTIC LEFT COLECTOMY,    Surgeon(s):  Kerri Garza MD    Assistant:  First Assistant: William Perea RN    Anesthesia: General/Local    Estimated Blood Loss (mL): 10 ml    Complications: None    Specimens:   ID Type Source Tests Collected by Time Destination   1 : urine.  sterile catheter catch Urine Bladder URINALYSIS (Canceled) Kerri Garza MD 7/20/2021 1260    A : sigmoid colon Tissue Sigmoid Colon SURGICAL PATHOLOGY Kerri Garza MD 7/20/2021 3611        Implants:  * No implants in log *      Drains:   Urethral Catheter Non-latex 16 fr (Active)       Findings: as above - see op note for details     Electronically signed by Kerri Garza MD on 7/20/2021 at 9:26 AM

## 2021-07-20 NOTE — OP NOTE
[address] [place] [address]                                OPERATIVE REPORT    PATIENT NAME: Moe MENDOZA                    :        1950  MED REC NO:   [id number]                           ROOM:  ACCOUNT NO:   [de-identified]                           ADMIT DATE: 2021  PROVIDER:     Bart Morrow M.D.    Abiodun Gundersonjocelynn OF PROCEDURE:  2021    PREOPERATIVE DIAGNOSIS:  Unresectable sessile sigmoid colon polyp. POSTOPERATIVE DIAGNOSIS:  Unresectable sessile sigmoid colon polyp. PROCEDURE:  Robotic sigmoid colectomy. SURGEON:  Bart Morrow MD    ASSISTANT:  Pedro Pablo Galarza. [place]    ANESTHESIA:  General/local.    ESTIMATED BLOOD LOSS:  Less than 20 mL. DRAINS:  None. COMPLICATIONS:  None. DISPOSITION:  Stable to the recovery room. INDICATIONS:  The patient is a 45-year-old female who I saw in the  office secondary to an unresectable sessile polyp in the sigmoid colon. Both operative and nonoperative intervention plans were discussed. Risks of surgery were further discussed. Some of the risks included but  were not limited to bleeding, infection, the need for reoperation,  severe chronic postoperative pain or numbness, major vascular or nerve  injury, cardiopulmonary complications, anesthetic complications,  seroma/hematoma formation, wound breakdown, trocar site herniation,  anastomotic leak, anastomotic bleed, anastomotic stricture, chronic  pain, and death. After all of the questions were answered in their  entirety and the patient was completely aware of the current situation,  she elected to proceed with the procedure. DESCRIPTION OF THE PROCEDURE:  After informed consent was signed and  placed on the chart, the patient was taken back to the operating room  and placed supine on the operating room table. General anesthesia was  induced. She tolerated this throughout the case.   She was positioned in  the [address] [place] [address]. All pressure points were padded. She was on  preoperative antibiotics. Bilateral lower extremity sequential  compression devices were placed prior to the incision. Her abdomen,  pelvis, and perianal/groin region were all prepped and draped in the  usual sterile standard fashion. A timeout occurred prior to the  operation which not only identified the patient, but also the planned  procedure to be performed. At the end of timeout, there were no  questions or concerns. I began the operation by making a small skin  nick at Juarez's point. Veress needle inserted. Intraabdominal cavity  was insufflated to a pressure of approximately 15 mmHg with carbon  dioxide gas. The patient tolerated insufflation well. 8-mm trocar  placed over on the right side of the abdomen. Laparoscope inserted. Upon initial evaluation, there was no hollow viscus, solid organ, or  major vascular injury with the Veress needle insertion or the first  trocar placement. Three other 8-mm trocars were then placed in their  standard location under direct vision. There was a fair amount of  adhesions in the midline secondary to previous operations which were  expected. Assistant 12-mm was able to be placed there on the far right  lateral abdominal region. The patient was placed in Trendelenburg with  the right side elevated. Robot brought in and docked. Instruments  placed under direct vision. Once everything aligned and in order, I  then unscrubbed and went back to the console. I began the operation  first by taking down the adhesions off the anterior abdominal wall which  pretty much just omentum. This only took about 10 minutes or less. The  [place] ink was easily visualized there on the sigmoid colon. Sigmoid was  very redundant. No other abnormalities in the abdomen were identified.    I began the operation first by fraying up the descending colon against  the line of Toldt all the way up to the sigmoid  colon. Large trocar site was closed at the fascia with Vicryl suture. At the completion of this, there were no fascial defects. Wound  protector site there on the midline was closed with multiple interrupted  #1 Novafil sutures. Care was taken to avoid bowel injury during that  time. Irrigation. Irrigant return was clear. Hemostasis was adequate. Skin was reapproximated at all the incisional sites with 4-0 Vicryl in a  subcuticular fashion. Closed incisions were then cleaned, dried, and  Steri-Strips applied. Dry sterile dressings applied. Sponge, needle,  and instrumentation count was correct at the end of the procedure. The  patient tolerated the procedure well with no apparent complications and  less than 20 mL of blood loss. Easily brought out of general anesthesia  and transferred to postanesthesia care unit in stable condition.         Castro Rose M.D.    D: 07/20/2021 9:34:24       T: 07/20/2021 10:58:04     [email or web address]  Job#: [id number]     Doc#: [id number]    CC:

## 2021-07-20 NOTE — PROGRESS NOTES
Admitted to PACu from OR  Report received from anesthesia. Lethargic but opens eyes when name called. Denies c/o pain at this time.

## 2021-07-20 NOTE — ANESTHESIA PRE PROCEDURE
Department of Anesthesiology  Preprocedure Note       Name:  Марина Aguilera   Age:  78 y.o.  :  1950                                          MRN:  [id number]         Date:  2021      Surgeon: Tarik Farley):  Floyd Thomas MD    Procedure: Procedure(s):  ROBOTIC LEFT COLECTOMY, POSS OPEN    Medications prior to admission:   Prior to Admission medications    Medication Sig Start Date End Date Taking? Authorizing Provider   Cholecalciferol (VITAMIN D3 GUMMIES PO) Take 2 each by mouth daily   Yes Historical Provider, MD   Cyanocobalamin (VITAMIN B 12 PO) Take 2 each by mouth daily   Yes Historical Provider, MD   Omega-3 Fatty Acids (OMEGA 3 PO) Take 2 each by mouth daily   Yes Historical Provider, MD   Multiple Vitamins-Minerals (WOMENS MULTI VITAMIN & MINERAL PO) Take 2 each by mouth daily   Yes Historical Provider, MD   calcium carbonate (OSCAL) 500 MG TABS tablet Take 500 mg by mouth daily Pt unsure on dose takes 4 tablets daily   Yes Historical Provider, MD   metroNIDAZOLE (FLAGYL) 500 MG tablet Take one tablet at 4pm, one tablet at 5pm and one tablet at 11pm the day prior to surgery. 21  Yes Floyd Thomas MD   esomeprazole Magnesium (NEXIUM) 40 MG PACK Take 40 mg by mouth daily    Historical Provider, MD   famotidine (PEPCID) 20 MG tablet Take 20 mg by mouth daily    Historical Provider, MD       Current medications:    Current Facility-Administered Medications   Medication Dose Route Frequency Provider Last Rate Last Admin    0.9 % sodium chloride infusion   Intravenous Continuous Rosa Elena Cartagena  mL/hr at  0649 New Bag at 21 0649    cefOXitin (MEFOXIN) 2000 mg in dextrose 5% 50 mL (mini-bag)  2,000 mg Intravenous Once Rosa Elena Cartagena LPN        naloxegol (MOVANTIK) tablet 12.5 mg  12.5 mg Oral Once Floyd Thomas MD           Allergies:     Allergies   Allergen Reactions    Other      Boniva-rash       Problem List:    Patient Active Problem List   Diagnosis Code    Polyp of sigmoid colon K63.5       Past Medical History:        Diagnosis Date    Colon stricture ([address]     polyp    Iron deficiency     sees Dr Nadine Saba       Past Surgical History:        Procedure Laterality Date     SECTION      x1    CHOLECYSTECTOMY      COLONOSCOPY  2021    COLONOSCOPY  2021    COLONOSCOPY  2021    [facility]    HYSTERECTOMY      age 29 still has ovaries    TONSILLECTOMY      UPPER GASTROINTESTINAL ENDOSCOPY  2021       Social History:    Social History     Tobacco Use    Smoking status: Never Smoker    Smokeless tobacco: Never Used   Substance Use Topics    Alcohol use: Yes     Comment: beer occ. Counseling given: Not Answered      Vital Signs (Current):   Vitals:    21 0610   BP: (!) 173/73   Pulse: 61   Resp: 18   Temp: 97 °F (36.1 °C)   TempSrc: Temporal   SpO2: 100%   Weight: 146 lb 12.8 oz (66.6 kg)   Height: 5' 4\" (1.626 m)                                              BP Readings from Last 3 Encounters:   21 (!) 173/73   21 118/62       NPO Status: Time of last liquid consumption: 2330                        Time of last solid consumption: 1200                        Date of last liquid consumption: 21                        Date of last solid food consumption: 21    BMI:   Wt Readings from Last 3 Encounters:   21 146 lb 12.8 oz (66.6 kg)   21 150 lb 12.8 oz (68.4 kg)     Body mass index is 25.2 kg/m². CBC: No results found for: WBC, RBC, HGB, HCT, MCV, RDW, PLT    CMP: No results found for: NA, K, CL, CO2, BUN, CREATININE, GFRAA, AGRATIO, LABGLOM, GLUCOSE, PROT, CALCIUM, BILITOT, ALKPHOS, AST, ALT    POC Tests: No results for input(s): POCGLU, POCNA, POCK, POCCL, POCBUN, POCHEMO, POCHCT in the last 72 hours.     Coags: No results found for: PROTIME, INR, APTT    HCG (If Applicable): No results found for: PREGTESTUR, PREGSERUM, HCG, HCGQUANT     ABGs: No results found for: PHART, PO2ART, ZGT5RRM, LLH3OUX, BEART, A3DYMTNT     Type & Screen (If Applicable):  No results found for: LABABO, LABRH    Drug/Infectious Status (If Applicable):  No results found for: HIV, HEPCAB    COVID-19 Screening (If Applicable): No results found for: COVID19        Anesthesia Evaluation  Patient summary reviewed  Airway: Mallampati: II  TM distance: >3 FB   Neck ROM: full  Mouth opening: > = 3 FB Dental:          Pulmonary:                              Cardiovascular:                      Neuro/Psych:               GI/Hepatic/Renal:             Endo/Other:                     Abdominal:             Vascular: Other Findings:             Anesthesia Plan      general     ASA 2       Induction: intravenous. MIPS: Postoperative opioids intended and Prophylactic antiemetics administered. Anesthetic plan and risks discussed with patient. Plan discussed with CRNA. Elma Aggarwal.  [address] [place] [address]   7/20/2021

## 2021-07-20 NOTE — ANESTHESIA POSTPROCEDURE EVALUATION
Department of Anesthesiology  Postprocedure Note    Patient: Kailey Mendoza  MRN: [id number]  YOB: 1950  Date of evaluation: 7/20/2021  Time:  2:29 PM     Procedure Summary     Date: 07/20/21 Room / Location: [address] [place] [address]    Anesthesia Start: 0728 Anesthesia Stop: 3670    Procedure: ROBOTIC LEFT COLECTOMY, (Left ) Diagnosis: (SIGMOID COLON POLYP)    Surgeons: Jojo Baeza MD Responsible Provider: Josiane Bergeron DO    Anesthesia Type: general ASA Status: 2          Anesthesia Type: general    Estefany Phase I: Estefany Score: 8    Estefany Phase II:      Last vitals: Reviewed and per EMR flowsheets. Anesthesia Post Evaluation    Comments: Jaime Gonzalez 62  POST-ANESTHESIA NOTE       Name:  Kailey Mendoza                                         Age:  78 y.o. MRN:  [id number]      Last Vitals:  BP (!) 141/64   Pulse 77   Temp 97.8 °F (36.6 °C) (Oral)   Resp 16   Ht 5' 4\" (1.626 m)   Wt 146 lb 12.8 oz (66.6 kg)   SpO2 96%   BMI 25.20 kg/m²   Patient Vitals in the past 4 hrs:  07/20/21 1310, BP:(!) 141/64, Temp:97.8 °F (36.6 °C), Temp src:Oral, Pulse:77, Resp:16, SpO2:96 %  07/20/21 1230, BP:(!) 143/65, Pulse:73, Resp:16, SpO2:92 %  07/20/21 1200, BP:134/65, Resp:16  07/20/21 1130, BP:(!) 141/65  07/20/21 1124, BP:(!) 159/75, Pulse:68, Resp:16, SpO2:99 %  07/20/21 1100, BP:(!) 166/68, Pulse:62, Resp:20  07/20/21 1050, BP:(!) 162/72  07/20/21 1035, BP:(!) 190/77, Pulse:70, Resp:20, SpO2:99 %    Level of Consciousness:  Awake    Respiratory:  Stable    Oxygen Saturation:  Stable    Cardiovascular:  Stable    Hydration:  Adequate    PONV:  Stable    Post-op Pain:  Adequate analgesia    Post-op Assessment:  No apparent anesthetic complications    Additional Follow-Up / Treatment / Comment:  None    Rossana Duffy.  DO Zayra  July 20, 2021   2:30 PM

## 2021-07-20 NOTE — INTERVAL H&P NOTE
Update History & Physical    The patient's History and Physical was reviewed with the patient and I examined the patient. There was no change. The surgical site was confirmed by the patient and me. Plan: The risks, benefits, expected outcome, and alternative to the recommended procedure have been discussed with the patient. Patient understands and wants to proceed with the procedure. The patient was counseled at length about the risks of dolly Covid-19 during their perioperative period and any recovery window from their procedure. The patient was made aware that dolly Covid-19  may worsen their prognosis for recovering from their procedure  and lend to a higher morbidity and/or mortality risk. All material risks, benefits, and reasonable alternatives including postponing the procedure were discussed. The patient does wish to proceed with the procedure at this time.     Electronically signed by Lexi Stallings MD on 7/20/2021 at 6:47 AM

## 2021-07-20 NOTE — PLAN OF CARE
Problem: Pain:  Goal: Pain level will decrease  Description: Pain level will decrease  Outcome: Ongoing  Patient states pain relief from PRN pain medications. Pain reassessed one hour post PRN pain medication given. Patient rates pain 4 on ISIDORO 0-10 scale. Problem: Pain:  Goal: Control of acute pain  Description: Control of acute pain  Outcome: Ongoing  Patient taking Norco PRN for acute pain control. Problem: GI  Goal: No bowel complications  Outcome: Ongoing  Patient bowel sounds hypoactive. Passing flatus. Pt taking prescribed medication to assist with BM. Problem: GI  Goal: Bowel movement at least every other day  Outcome: Ongoing   No BM today. Problem: Skin Integrity/Risk  Goal: No skin breakdown during hospitalization  Outcome: Ongoing  No skin breakdown this shift. Patient being assisted with turning. Patients states understanding of repositioning every two hours. Problem: Skin Integrity/Risk  Goal: Wound healing  Outcome: Ongoing  Surgical incision dry/intact. Problem: Falls - Risk of:  Goal: Will remain free from falls  Description: Will remain free from falls  Outcome: Met This Shift  Fall assessment completed. Patient using call light appropriately to call for assistance with ambulation to bathroom. Personal items within reach. Patient is also compliant with use of non-skid slippers. Problem: Falls - Risk of:  Goal: Absence of physical injury  Description: Absence of physical injury  Outcome: Met This Shift  No falls noted this shift. Patient ambulates with x1 staff assistance without difficulty. Family member at bedside, spent the day. Bed kept in low position. Safe environment maintained. Bedside table & call light in reach. Uses call light appropriately when needing assistance. Care plan reviewed with patient and family. Patient and family verbalize understanding of the plan of care and contribute to goal setting.

## 2021-07-21 LAB
ANION GAP SERPL CALCULATED.3IONS-SCNC: 10 MEQ/L (ref 8–16)
BUN BLDV-MCNC: 6 MG/DL (ref 7–22)
CALCIUM SERPL-MCNC: 8.7 MG/DL (ref 8.5–10.5)
CHLORIDE BLD-SCNC: 109 MEQ/L (ref 98–111)
CO2: 23 MEQ/L (ref 23–33)
CREAT SERPL-MCNC: 0.5 MG/DL (ref 0.4–1.2)
GFR SERPL CREATININE-BSD FRML MDRD: > 90 ML/MIN/1.73M2
GLUCOSE BLD-MCNC: 106 MG/DL (ref 70–108)
HCT VFR BLD CALC: 38.2 % (ref 37–47)
HEMOGLOBIN: 12.2 GM/DL (ref 12–16)
POTASSIUM REFLEX MAGNESIUM: 4 MEQ/L (ref 3.5–5.2)
SODIUM BLD-SCNC: 142 MEQ/L (ref 135–145)

## 2021-07-21 PROCEDURE — 85018 HEMOGLOBIN: CPT

## 2021-07-21 PROCEDURE — 6370000000 HC RX 637 (ALT 250 FOR IP): Performed by: SURGERY

## 2021-07-21 PROCEDURE — 80048 BASIC METABOLIC PNL TOTAL CA: CPT

## 2021-07-21 PROCEDURE — 1200000000 HC SEMI PRIVATE

## 2021-07-21 PROCEDURE — 85014 HEMATOCRIT: CPT

## 2021-07-21 PROCEDURE — 99024 POSTOP FOLLOW-UP VISIT: CPT | Performed by: SURGERY

## 2021-07-21 PROCEDURE — 6360000002 HC RX W HCPCS: Performed by: SURGERY

## 2021-07-21 PROCEDURE — 36415 COLL VENOUS BLD VENIPUNCTURE: CPT

## 2021-07-21 RX ADMIN — NALOXEGOL OXALATE 12.5 MG: 12.5 TABLET, FILM COATED ORAL at 09:12

## 2021-07-21 RX ADMIN — HYDROCODONE BITARTRATE AND ACETAMINOPHEN 2 TABLET: 5; 325 TABLET ORAL at 16:13

## 2021-07-21 RX ADMIN — HYDROCODONE BITARTRATE AND ACETAMINOPHEN 2 TABLET: 5; 325 TABLET ORAL at 20:23

## 2021-07-21 RX ADMIN — HYDROCODONE BITARTRATE AND ACETAMINOPHEN 2 TABLET: 5; 325 TABLET ORAL at 04:18

## 2021-07-21 RX ADMIN — HYDROCODONE BITARTRATE AND ACETAMINOPHEN 2 TABLET: 5; 325 TABLET ORAL at 10:21

## 2021-07-21 RX ADMIN — ENOXAPARIN SODIUM 40 MG: 40 INJECTION SUBCUTANEOUS at 09:14

## 2021-07-21 ASSESSMENT — PAIN SCALES - GENERAL
PAINLEVEL_OUTOF10: 0
PAINLEVEL_OUTOF10: 8
PAINLEVEL_OUTOF10: 0
PAINLEVEL_OUTOF10: 0
PAINLEVEL_OUTOF10: 7
PAINLEVEL_OUTOF10: 0
PAINLEVEL_OUTOF10: 3
PAINLEVEL_OUTOF10: 7
PAINLEVEL_OUTOF10: 7
PAINLEVEL_OUTOF10: 5

## 2021-07-21 NOTE — CARE COORDINATION
7/21/21, 9:10 AM EDT  DISCHARGE PLANNING EVALUATION:    Aarti Ards       Admitted: 7/20/2021/ [address] day: 1   Location: [facility]28/028-A Reason for admit: Polyp of sigmoid colon, unspecified type [K63.5]   PMH:  has a past medical history of Colon stricture (HCC) and Iron deficiency. Procedure:   7/20 ROBOTIC LEFT COLECTOMY    Barriers to Discharge: POD #1. Admitted from Surgery. Afebrile. On room air. A&O. Follows commands. Advanced to full liquid diet today. Abdominal binder. SCDs. IVF, lovenox, prn norco, prn IV morphine, movantik. Urine w/small leukocytes - sent for culture. PCP: Sandra Uribe MD  Readmission Risk Score: 7%    Patient Goals/Plan/Treatment Preferences: Spoke with Bebo Carrizales; states she lives at home with her husabnd and did not use any DME or have any HH services PTA. She states she has cane, walker and toilet riser at home if she would need them, from past surgeries she and her  have had. Bebo Carrizales states she plans to return home with her  at discharge, denies needs, and declines HH. Transportation/Food Security/Housekeeping Addressed:  No issues identified.

## 2021-07-21 NOTE — PLAN OF CARE
Problem: Pain:  Goal: Pain level will decrease  Description: Pain level will decrease  Outcome: Ongoing  Patient states pain relief from PRN pain medications. Pain reassessed one hour post PRN pain medication given. Patient rates pain 4-7 on ISIDORO 0-10 scale. Problem: Pain:  Goal: Control of acute pain  Description: Control of acute pain  Outcome: Ongoing  Patient taking Norco PRN for acute pain control. Problem: GI  Goal: No bowel complications  Outcome: Ongoing  Patient bowel sounds active. Passing  flatus. Pt taking prescribed medication to assist with BM. Problem: GI  Goal: Bowel movement at least every other day  Outcome: Ongoing  No BM today. Patient on full liquid diet. Problem: Skin Integrity/Risk  Goal: No skin breakdown during hospitalization  Outcome: Ongoing  No skin breakdown this shift. Patient being assisted with turning. Patients states understanding of repositioning every two hours. Problem: Skin Integrity/Risk  Goal: Wound healing  Outcome: Ongoing  Abdominal surgical wound healing. Problem: Falls - Risk of:  Goal: Will remain free from falls  Description: Will remain free from falls  Outcome: Met This Shift  Fall assessment completed. Patient using call light appropriately to call for assistance with ambulation to bathroom. Personal items within reach. Patient is also compliant with use of non-skid slippers. Problem: Falls - Risk of:  Goal: Absence of physical injury  Description: Absence of physical injury  Outcome: Met This Shift  No falls noted this shift. Patient ambulates with x1 staff assistance without difficulty. Family member at bedside, spent the day. . Bed kept in low position. Safe environment maintained. Bedside table & call light in reach. Uses call light appropriately when needing assistance. Care plan reviewed with patient and family. Patient and family verbalize understanding of the plan of care and contribute to goal setting.

## 2021-07-21 NOTE — PROGRESS NOTES
Tuyet Ambriz MD  Postoperative Progress Note    Pt Name: Harshad Santiago  Medical Record Number: [id number]  Date of Birth 1950   Today's Date: 2021    ASSESSMENT   1. POD # 1 status post robotic sigmoid colectomy  2.  has a past medical history of Colon stricture (HCC) and Iron deficiency. PLAN   1. Full liquids  2. Decrease IV fluids  3. Remove Brown catheter  4. Ambulate  5. Nausea and pain control  6. Incisional/wound care  7. Await pathology results  8. A.m. labs    --Clinically, looks good this morning  SUBJECTIVE   Stable overnight. Chart reviewed. Afebrile. Vital signs stable. Pain controlled with pain medication. No nausea. Just removing Brown catheter by nursing staff. Tolerating clear liquids. No lightheadedness or dizziness. No chest pain or shortness of breath. No flatus or bowel movement. Patient states she feels pretty good this morning. CURRENT MEDICATIONS   Scheduled Meds:   sodium chloride flush  5-40 mL Intravenous 2 times per day    enoxaparin  40 mg Subcutaneous Daily    naloxegol  12.5 mg Oral QAM     Continuous Infusions:   sodium chloride      sodium chloride 50 mL/hr at 21 0911     PRN Meds:.sodium chloride flush, sodium chloride, ondansetron **OR** ondansetron, morphine **OR** morphine, HYDROcodone 5 mg - acetaminophen **OR** HYDROcodone 5 mg - acetaminophen, ondansetron  OBJECTIVE   CURRENT VITALS:  height is 5' 4\" (1.626 m) and weight is 146 lb 12.8 oz (66.6 kg). Her oral temperature is 98.1 °F (36.7 °C). Her blood pressure is 128/60 and her pulse is 59. Her respiration is 16 and oxygen saturation is 97%.    Temperature Range (24h):Temp: 98.1 °F (36.7 °C) Temp  Av.8 °F (36.6 °C)  Min: 97.6 °F (36.4 °C)  Max: 98.1 °F (36.7 °C)  BP Range (15T): Systolic (94RUZ), NUO:085 , Min:114 , ITP:971     Diastolic (16NAL), SJM:48, Min:59, Max:70    Pulse Range (24h): Pulse  Av.6  Min: 57  Max: 77  Respiration Range (24h): Resp  Av  Min: 16  Max: 16  Current Pulse Ox (24h):  SpO2: 97 %  Pulse Ox Range (24h):  SpO2  Av %  Min: 94 %  Max: 98 %  Oxygen Amount and Delivery: O2 Flow Rate (L/min): 3 L/min  Incentive Spirometry Tx:            GENERAL: alert, no distress  LUNGS: clear to ausculation, without wheezes, rales or rhonci  HEART: normal rate and regular rhythm  ABDOMEN: non-distended, soft, incisional tenderness, bowel sounds hypoactive. No guarding or peritoneal signs  INCISION: healing well, no significant drainage, no significant erythema  EXTERMITY: no cyanosis, clubbing or edema    In: 1571 [I.V.:1571]  Out: 900 [Urine:900]  Date 21 - 21   Shift 3620-7494 8814-7669 6289-4994 24 Hour Total   INTAKE   I.V.(mL/kg) 827(12.4)   827(12.4)   Shift Total(mL/kg) 827(12.4)   827(12.4)   OUTPUT   Urine(mL/kg/hr) 300(0.6) 0  300   Emesis/NG output(mL/kg)  0(0)  0(0)   Other(mL/kg)  0(0)  0(0)   Stool(mL/kg)  0(0)  0(0)   Blood(mL/kg)  0(0)  0(0)   Shift Total(mL/kg) 300(4.5) 0(0)  300(4.5)   Weight (kg) 66.6 66.6 66.6 66.6     LABS     Recent Labs     21  0709 21  0717   HGB  --  12.2   HCT  --  38.2   NA  --  142   K  --  4.0   CL  --  109   CO2  --  23   BUN  --  6*   CREATININE 0.5 0.5   CALCIUM  --  8.7      No results for input(s): PTT, INR in the last 72 hours. Invalid input(s): PT  No results for input(s): AST, ALT, BILITOT, BILIDIR, AMYLASE, LIPASE, LDH, LACTA in the last 72 hours. No results for input(s): TROPONINT in the last 72 hours.     RADIOLOGY    none    Electronically signed by Cyrus Malone MD on 2021 at 12:35 PM

## 2021-07-21 NOTE — PLAN OF CARE
Problem: Pain:  Goal: Control of acute pain  Description: Control of acute pain  7/21/2021 0151 by Migel Vega RN  Outcome: Ongoing   Patient pain 7/10. PRN Zearing given. Patient satisfied. Problem: GI  Goal: Bowel movement at least every other day  7/21/2021 0151 by Migel Vega RN  Outcome: Ongoing   No bm this shift. Bowel sounds active in all four quadrants. Problem: Skin Integrity/Risk  Goal: No skin breakdown during hospitalization  7/21/2021 0151 by Migel Vega RN  Outcome: Ongoing   Patient repositions self every 2 hours. Patient verbalizes understanding of importance to turn. Heels elevated off of bed. Bonifacio score completed. Skin inspected with every assessment. Problem: Falls - Risk of:  Goal: Will remain free from falls  Description: Will remain free from falls  7/21/2021 0151 by Migel Vega RN  Outcome: Ongoing   Patient bed in lowest position, wheels locked, 2/4 side rails up and alarm on. Call light and belongings within reach. Pathway clear. Nonskid footwear on. Patient rounded on hourly. Fall risk assessment complete. Patient remains free from falls this shift, will continue to monitor. Care plan reviewed with patient. Patient verbalizes understanding of the plan of care and contribute to goal setting.

## 2021-07-22 LAB
ANION GAP SERPL CALCULATED.3IONS-SCNC: 9 MEQ/L (ref 8–16)
BUN BLDV-MCNC: 4 MG/DL (ref 7–22)
CALCIUM SERPL-MCNC: 9 MG/DL (ref 8.5–10.5)
CHLORIDE BLD-SCNC: 110 MEQ/L (ref 98–111)
CO2: 24 MEQ/L (ref 23–33)
CREAT SERPL-MCNC: 0.5 MG/DL (ref 0.4–1.2)
GFR SERPL CREATININE-BSD FRML MDRD: > 90 ML/MIN/1.73M2
GLUCOSE BLD-MCNC: 91 MG/DL (ref 70–108)
HCT VFR BLD CALC: 37.5 % (ref 37–47)
HEMOGLOBIN: 11.8 GM/DL (ref 12–16)
POTASSIUM SERPL-SCNC: 3.8 MEQ/L (ref 3.5–5.2)
SODIUM BLD-SCNC: 143 MEQ/L (ref 135–145)
URINE CULTURE, ROUTINE: NORMAL

## 2021-07-22 PROCEDURE — 85014 HEMATOCRIT: CPT

## 2021-07-22 PROCEDURE — 6370000000 HC RX 637 (ALT 250 FOR IP): Performed by: SURGERY

## 2021-07-22 PROCEDURE — 85018 HEMOGLOBIN: CPT

## 2021-07-22 PROCEDURE — 1200000000 HC SEMI PRIVATE

## 2021-07-22 PROCEDURE — 6360000002 HC RX W HCPCS: Performed by: SURGERY

## 2021-07-22 PROCEDURE — 99024 POSTOP FOLLOW-UP VISIT: CPT | Performed by: SURGERY

## 2021-07-22 PROCEDURE — 80048 BASIC METABOLIC PNL TOTAL CA: CPT

## 2021-07-22 PROCEDURE — 36415 COLL VENOUS BLD VENIPUNCTURE: CPT

## 2021-07-22 RX ORDER — HYDRALAZINE HYDROCHLORIDE 10 MG/1
10 TABLET, FILM COATED ORAL 4 TIMES DAILY PRN
Status: DISCONTINUED | OUTPATIENT
Start: 2021-07-22 | End: 2021-07-24 | Stop reason: HOSPADM

## 2021-07-22 RX ADMIN — HYDRALAZINE HYDROCHLORIDE 10 MG: 10 TABLET, FILM COATED ORAL at 17:19

## 2021-07-22 RX ADMIN — HYDROCODONE BITARTRATE AND ACETAMINOPHEN 2 TABLET: 5; 325 TABLET ORAL at 04:33

## 2021-07-22 RX ADMIN — NALOXEGOL OXALATE 12.5 MG: 12.5 TABLET, FILM COATED ORAL at 08:09

## 2021-07-22 RX ADMIN — HYDROCODONE BITARTRATE AND ACETAMINOPHEN 2 TABLET: 5; 325 TABLET ORAL at 10:32

## 2021-07-22 RX ADMIN — ENOXAPARIN SODIUM 40 MG: 40 INJECTION SUBCUTANEOUS at 08:08

## 2021-07-22 ASSESSMENT — PAIN DESCRIPTION - PAIN TYPE: TYPE: SURGICAL PAIN;CHRONIC PAIN

## 2021-07-22 ASSESSMENT — PAIN DESCRIPTION - DESCRIPTORS: DESCRIPTORS: ACHING;SORE

## 2021-07-22 ASSESSMENT — PAIN DESCRIPTION - FREQUENCY: FREQUENCY: CONTINUOUS

## 2021-07-22 ASSESSMENT — PAIN SCALES - GENERAL
PAINLEVEL_OUTOF10: 0
PAINLEVEL_OUTOF10: 5
PAINLEVEL_OUTOF10: 7

## 2021-07-22 ASSESSMENT — PAIN DESCRIPTION - ONSET: ONSET: ON-GOING

## 2021-07-22 ASSESSMENT — PAIN DESCRIPTION - ORIENTATION: ORIENTATION: MID;RIGHT;LEFT

## 2021-07-22 ASSESSMENT — PAIN DESCRIPTION - LOCATION: LOCATION: ABDOMEN;SHOULDER

## 2021-07-22 NOTE — PROGRESS NOTES
Tuyet Ambriz MD  Postoperative Progress Note    Pt Name: Harshad Santiago  Medical Record Number: [id number]  Date of Birth 1950   Today's Date: 2021    ASSESSMENT   1. POD # 2 status post robotic sigmoid colectomy   has a past medical history of Colon stricture (HCC) and Iron deficiency. PLAN   1. Full liquids  2. Decreased IV fluids  3. Urinating spontaneously  4. Ambulate  5. Nausea and pain control  6. Incisional/wound care  7. Await pathology results  8. A.m. labs reviewed. Repeat as needed    --Clinically, looks good this morning  SUBJECTIVE   Stable overnight. Chart reviewed. Afebrile. Vital signs stable. Pain controlled with pain medication. No nausea. Urinating spontaneously. Tolerating full liquids. No lightheadedness or dizziness. No chest pain or shortness of breath. No flatus or bowel movement. CURRENT MEDICATIONS   Scheduled Meds:   sodium chloride flush  5-40 mL Intravenous 2 times per day    enoxaparin  40 mg Subcutaneous Daily    naloxegol  12.5 mg Oral QAM     Continuous Infusions:   sodium chloride      sodium chloride 50 mL/hr at 21 0911     PRN Meds:.sodium chloride flush, sodium chloride, ondansetron **OR** ondansetron, morphine **OR** morphine, HYDROcodone 5 mg - acetaminophen **OR** HYDROcodone 5 mg - acetaminophen, ondansetron  OBJECTIVE   CURRENT VITALS:  height is 5' 4\" (1.626 m) and weight is 146 lb 12.8 oz (66.6 kg). Her oral temperature is 97.6 °F (36.4 °C). Her blood pressure is 160/70 (abnormal) and her pulse is 53. Her respiration is 16 and oxygen saturation is 97%.    Temperature Range (24h):Temp: 97.6 °F (36.4 °C) Temp  Av.5 °F (36.4 °C)  Min: 97.1 °F (36.2 °C)  Max: 97.8 °F (36.6 °C)  BP Range (49J): Systolic (88CDC), PPL:929 , Min:142 , XZB:819     Diastolic (99SYO), ELJ:77, Min:64, Max:76    Pulse Range (24h): Pulse  Av  Min: 53  Max: 67  Respiration Range (24h): Resp  Av  Min: 16  Max: 16  Current Pulse Ox (24h):  SpO2: 97 %  Pulse Ox Range (24h):  SpO2  Av.8 %  Min: 97 %  Max: 99 %  Oxygen Amount and Delivery: O2 Flow Rate (L/min): 3 L/min  Incentive Spirometry Tx:            GENERAL: alert, no distress  LUNGS: clear to ausculation, without wheezes, rales or rhonci  HEART: normal rate and regular rhythm  ABDOMEN: non-distended, soft, incisional tenderness, bowel sounds hypoactive. No guarding or peritoneal signs  INCISION: healing well, no significant drainage, no significant erythema  EXTERMITY: no cyanosis, clubbing or edema    In: 1645.7 [P.O.:360; I.V.:1285.7]  Out: -     LABS     Recent Labs     21  0709 21  0717 21  0634   HGB  --  12.2 11.8*   HCT  --  38.2 37.5   NA  --  142 143   K  --  4.0 3.8   CL  --  109 110   CO2  --  23 24   BUN  --  6* 4*   CREATININE 0.5 0.5 0.5   CALCIUM  --  8.7 9.0      No results for input(s): PTT, INR in the last 72 hours. Invalid input(s): PT  No results for input(s): AST, ALT, BILITOT, BILIDIR, AMYLASE, LIPASE, LDH, LACTA in the last 72 hours. No results for input(s): TROPONINT in the last 72 hours.     Pathology: Pending  RADIOLOGY    none    Electronically signed by Tana Das MD on 2021 at 8:21 AM

## 2021-07-22 NOTE — PLAN OF CARE
Problem: Pain:  Goal: Pain level will decrease  Description: Pain level will decrease  Outcome: Ongoing  Note: Patient has complaints of surgical pain. Prn medications given when available. Encouraged to reposition. Will continue to monitor. Problem: GI  Goal: No bowel complications  Outcome: Ongoing  Note: Hypoactive bowel sounds. Starting to pass some flatus, no bowel movement. Has stool softeners ordered. Problem: GI  Goal: Bowel movement at least every other day  Outcome: Ongoing  Note: Starting to pass some flatus, no bowel movement. Stool softeners ordered. Problem: Skin Integrity/Risk  Goal: Wound healing  Outcome: Ongoing  Note: Surgical sites healing, no drainage, clean and dry. Will continue to assess. Problem: Falls - Risk of:  Goal: Will remain free from falls  Description: Will remain free from falls  Outcome: Ongoing  Note: Alert and oriented. Bed/chair alarms on. Call light in reach and used appropriately. Ambulates with standby assist. Will continue to hourly round. Care plan reviewed with patient. Patient verbalizes understanding of the plan of care and contributes to goal setting.

## 2021-07-22 NOTE — FLOWSHEET NOTE
07/22/21 1622   Provider Notification   Reason for Communication Evaluate   Provider Name Dr. Yesi Jaimes   Provider Notification Physician   Method of Communication Call   Response See orders   Notification Time 1615     /76 manual. Dr. Yesi Jaimes stated to order PO hydralazine 4 times daily prn for SBP >160 and DBP >80.

## 2021-07-22 NOTE — CARE COORDINATION
7/22/21, 7:26 AM EDT    DISCHARGE ON [address] day: 2  Location: [facility]28/028-A Reason for admit: Polyp of sigmoid colon, unspecified type [K63.5]   Procedure:  status post robotic sigmoid colectomy  Barriers to Discharge: POD #2, on full liquids, abdominal binder, IVF, pain control, Movantik, follow urine culture. Hgb 11.8. General surgery  PCP: Daphne Mondragon MD  Readmission Risk Score: 7%  Patient Goals/Plan/Treatment Preferences: Planning home with ; denied needs or services.

## 2021-07-23 PROCEDURE — 1200000000 HC SEMI PRIVATE

## 2021-07-23 PROCEDURE — 6360000002 HC RX W HCPCS: Performed by: SURGERY

## 2021-07-23 PROCEDURE — 99024 POSTOP FOLLOW-UP VISIT: CPT | Performed by: SURGERY

## 2021-07-23 PROCEDURE — 6370000000 HC RX 637 (ALT 250 FOR IP): Performed by: SURGERY

## 2021-07-23 RX ORDER — PANTOPRAZOLE SODIUM 40 MG/1
40 TABLET, DELAYED RELEASE ORAL
Status: DISCONTINUED | OUTPATIENT
Start: 2021-07-23 | End: 2021-07-24 | Stop reason: HOSPADM

## 2021-07-23 RX ADMIN — ENOXAPARIN SODIUM 40 MG: 40 INJECTION SUBCUTANEOUS at 08:46

## 2021-07-23 RX ADMIN — PANTOPRAZOLE SODIUM 40 MG: 40 TABLET, DELAYED RELEASE ORAL at 14:54

## 2021-07-23 RX ADMIN — HYDROCODONE BITARTRATE AND ACETAMINOPHEN 1 TABLET: 5; 325 TABLET ORAL at 20:57

## 2021-07-23 RX ADMIN — NALOXEGOL OXALATE 12.5 MG: 12.5 TABLET, FILM COATED ORAL at 08:44

## 2021-07-23 RX ADMIN — HYDROCODONE BITARTRATE AND ACETAMINOPHEN 1 TABLET: 5; 325 TABLET ORAL at 13:07

## 2021-07-23 ASSESSMENT — PAIN SCALES - GENERAL
PAINLEVEL_OUTOF10: 4
PAINLEVEL_OUTOF10: 3
PAINLEVEL_OUTOF10: 0
PAINLEVEL_OUTOF10: 5
PAINLEVEL_OUTOF10: 5

## 2021-07-23 ASSESSMENT — PAIN DESCRIPTION - FREQUENCY
FREQUENCY: CONTINUOUS

## 2021-07-23 ASSESSMENT — PAIN DESCRIPTION - DESCRIPTORS
DESCRIPTORS: ACHING
DESCRIPTORS: ACHING
DESCRIPTORS: ACHING;SORE

## 2021-07-23 ASSESSMENT — PAIN DESCRIPTION - LOCATION
LOCATION: ABDOMEN

## 2021-07-23 ASSESSMENT — PAIN - FUNCTIONAL ASSESSMENT
PAIN_FUNCTIONAL_ASSESSMENT: ACTIVITIES ARE NOT PREVENTED
PAIN_FUNCTIONAL_ASSESSMENT: ACTIVITIES ARE NOT PREVENTED

## 2021-07-23 ASSESSMENT — PAIN DESCRIPTION - ONSET
ONSET: ON-GOING

## 2021-07-23 ASSESSMENT — PAIN DESCRIPTION - PAIN TYPE
TYPE: SURGICAL PAIN

## 2021-07-23 ASSESSMENT — PAIN DESCRIPTION - PROGRESSION
CLINICAL_PROGRESSION: GRADUALLY WORSENING
CLINICAL_PROGRESSION: GRADUALLY IMPROVING

## 2021-07-23 ASSESSMENT — PAIN DESCRIPTION - ORIENTATION
ORIENTATION: MID;LOWER
ORIENTATION: MID
ORIENTATION: MID;LOWER

## 2021-07-23 NOTE — PLAN OF CARE
Problem: Pain:  Goal: Control of acute pain  Description: Control of acute pain  Outcome: Ongoing  Patient pain minimal this shift. Patient satisfied. Problem: GI  Goal: Bowel movement at least every other day  Outcome: Ongoing  Patient tolerating regular diet. No bm this shift. Bowel sounds active in all four quadrants. Patient passing gas. Problem: Skin Integrity/Risk  Goal: No skin breakdown during hospitalization  Outcome: Ongoing   Patient repositions self every 2 hours. Patient verbalizes understanding of importance to turn. Heels elevated off of bed. Bonifacio score completed. Skin inspected with every assessment. Problem: Falls - Risk of:  Goal: Will remain free from falls  Description: Will remain free from falls  Outcome: Ongoing   Patient bed in lowest position, wheels locked, 2/4 side rails up and alarm on. Call light and belongings within reach. Pathway clear. Nonskid footwear on. Patient rounded on hourly. Fall risk assessment complete. Patient remains free from falls this shift, will continue to monitor. Care plan reviewed with patient. Patient verbalizes understanding of the plan of care and contribute to goal setting.

## 2021-07-23 NOTE — CARE COORDINATION
7/23/21, 3:17 PM EDT    DISCHARGE ON [facility] day: 3  Location: -28/028-A Reason for admit: Polyp of sigmoid colon, unspecified type [K63.5]   Procedure: 7/20/2021 Robotic sigmoid colectomy. Barriers to Discharge: IV fluids, Lovenox, Movantik, prn medications, abdominal binder, ambulate, incentive spirometry, SCD's, up as tolerated with assistance. PCP: [place], MD  Readmission Risk Score: 8%  Patient Goals/Plan/Treatment Preferences: George Mason is from home with her . She denies needs at discharge. Anticipate discharge tomorrow. 7/23/21, 3:20 PM EDT    Patient goals/plan/ treatment preferences discussed by  and . Patient goals/plan/ treatment preferences reviewed with patient/ family. Patient/ family verbalize understanding of discharge plan and are in agreement with goal/plan/treatment preferences. Understanding was demonstrated using the teach back method. AVS provided by RN at time of discharge, which includes all necessary medical information pertaining to the patients current course of illness, treatment, post-discharge goals of care, and treatment preferences. IMM Letter  IMM Letter given to Patient/Family/Significant other/Guardian/POA/by[de-identified] copy delivered to patient by mgr. Anthony  IMM Letter date given[de-identified] 07/23/21  IMM Letter time given[de-identified] 1900

## 2021-07-23 NOTE — PROGRESS NOTES
Jose Stinson MD  Postoperative Progress Note    Pt Name: Nael Lucero  Medical Record Number: [id number]  Date of Birth 1950   Today's Date: 2021    ASSESSMENT   1. POD # 3 status post robotic sigmoid colectomy   has a past medical history of Colon stricture (HCC) and Iron deficiency. PLAN   1. Soft diet  2. Bowel function returning  3. Decreased IV fluids  4. Urinating spontaneously  5. Ambulate  6. Nausea and pain control  7. Incisional/wound care  8. Pathology results reviewed with patient. All questions answered. No signs of carcinoma. 9. DVT prophylaxis  10. Home later today/tomorrow morning depending how she progresses but clinically looks good this morning. --Clinically, looks good this morning  SUBJECTIVE   Stable overnight. Chart reviewed. Afebrile. Vital signs stable. Blood pressure better. Pain controlled with pain medication. No nausea. Urinating spontaneously. Tolerating soft diet for dinner. No lightheadedness or dizziness. No chest pain or shortness of breath. Passing flatus and has had bowel movement. Patient feeling a lot better today. CURRENT MEDICATIONS   Scheduled Meds:   sodium chloride flush  5-40 mL Intravenous 2 times per day    enoxaparin  40 mg Subcutaneous Daily    naloxegol  12.5 mg Oral QAM     Continuous Infusions:   sodium chloride      sodium chloride 50 mL/hr at 21 0911     PRN Meds:.hydrALAZINE, sodium chloride flush, sodium chloride, ondansetron **OR** ondansetron, morphine **OR** morphine, HYDROcodone 5 mg - acetaminophen **OR** HYDROcodone 5 mg - acetaminophen, ondansetron  OBJECTIVE   CURRENT VITALS:  height is 5' 4\" (1.626 m) and weight is 146 lb 12.8 oz (66.6 kg). Her oral temperature is 97.6 °F (36.4 °C). Her blood pressure is 139/83 and her pulse is 70. Her respiration is 16 and oxygen saturation is 96%.    Temperature Range (24h):Temp: 97.6 °F (36.4 °C) Temp  Av.7 °F (36.5 °C)  Min: 97.4 °F (36.3 °C)  Max: 98.2 °F (36.8 °C)  BP Range (86F): Systolic (41EAY), YOS:299 , Min:139 , AOX:525     Diastolic (09XPA), BHQ:70, Min:70, Max:83    Pulse Range (24h): Pulse  Av.5  Min: 53  Max: 70  Respiration Range (24h): Resp  Av  Min: 16  Max: 16  Current Pulse Ox (24h):  SpO2: 96 %  Pulse Ox Range (24h):  SpO2  Av.5 %  Min: 96 %  Max: 97 %  Oxygen Amount and Delivery: O2 Flow Rate (L/min): 3 L/min  Incentive Spirometry Tx:            GENERAL: alert, no distress  LUNGS: clear to ausculation, without wheezes, rales or rhonci  HEART: normal rate and regular rhythm  ABDOMEN: non-distended, soft, incisional tenderness, bowel sounds hypoactive. No guarding or peritoneal signs  INCISION: healing well, no significant drainage, no significant erythema  EXTERMITY: no cyanosis, clubbing or edema    In: 632 [I.V.:632]  Out: -   Date 21 0000 - 21 2359   Shift 6001-9747 6591-9663 2599-1851 24 Hour Total   INTAKE   I.V.(mL/kg) 343(5.2)   343(5.2)   Shift Total(mL/kg) 343(5.2)   343(5.2)   OUTPUT   Shift Total(mL/kg)       Weight (kg) 66.6 66.6 66.6 66.6     LABS     Recent Labs     21  0717 21  0634   HGB 12.2 11.8*   HCT 38.2 37.5    143   K 4.0 3.8    110   CO2 23 24   BUN 6* 4*   CREATININE 0.5 0.5   CALCIUM 8.7 9.0      No results for input(s): PTT, INR in the last 72 hours. Invalid input(s): PT  No results for input(s): AST, ALT, BILITOT, BILIDIR, AMYLASE, LIPASE, LDH, LACTA in the last 72 hours. No results for input(s): TROPONINT in the last 72 hours. PATHOLOGY REPORT                       ATTN: Jace Quick                       REQ: Jace Mulligan     Copies To:   EUNICE GALLEGO     Clinical Information: SIGMOID COLON POLYP     FINAL DIAGNOSIS:   Sigmoid colon polyp, resection:    Flat adenoma, examined margins free of dysplasia.    Negative for invasive neoplasia. Specimen:   SIGMOID COLON, STITCH ON DISTAL END     Gross Examination:    The container is labeled Alex [place], sigmoid colon.  Received fresh   is a segmental resection of bowel measuring 9 cm in length.  There is   surrounding yellow adipose tissue. Pete Arrieta is [place] ink tattooing   identified.  The specimen is opened longitudinally revealing a pink-tan   mucosal surface.  There are no large masses. Pete Arrieta is a single 0.6 cm   polyp identified.  The polyp is grossly confined to the mucosal   surface.  The specimen is oriented with a suture on the distal   resection line.  The polyp is 3.5 cm from the proximal resection line. Representative sections are submitted.  Cassette #1 - Proximal   resection line; cassette #2 - distal resection line; cassette #3 -   polyp; and cassette #4 - representative bowel.  ss.  EKM/DKR:[id number]     Microscopic Examination:   The segment of bowel demonstrates an adenomatous mucosa which is   relatively flat architecture.  The examined margins are free of the   dysplasia.  No invasive neoplasia is recognized.                                                Melissa Borja M.D., F.C.A.P.    RADIOLOGY    none    Electronically signed by Edward Ambriz MD on 7/23/2021 at 7:42 AM

## 2021-07-24 VITALS
HEART RATE: 61 BPM | HEIGHT: 64 IN | BODY MASS INDEX: 25.06 KG/M2 | TEMPERATURE: 97.9 F | WEIGHT: 146.8 LBS | DIASTOLIC BLOOD PRESSURE: 67 MMHG | OXYGEN SATURATION: 97 % | RESPIRATION RATE: 16 BRPM | SYSTOLIC BLOOD PRESSURE: 145 MMHG

## 2021-07-24 PROCEDURE — 6360000002 HC RX W HCPCS: Performed by: SURGERY

## 2021-07-24 PROCEDURE — 6370000000 HC RX 637 (ALT 250 FOR IP): Performed by: SURGERY

## 2021-07-24 RX ORDER — HYDROCODONE BITARTRATE AND ACETAMINOPHEN 5; 325 MG/1; MG/1
1 TABLET ORAL EVERY 6 HOURS PRN
Qty: 12 TABLET | Refills: 0 | Status: SHIPPED | OUTPATIENT
Start: 2021-07-24 | End: 2021-07-27

## 2021-07-24 RX ADMIN — ENOXAPARIN SODIUM 40 MG: 40 INJECTION SUBCUTANEOUS at 09:26

## 2021-07-24 RX ADMIN — PANTOPRAZOLE SODIUM 40 MG: 40 TABLET, DELAYED RELEASE ORAL at 05:09

## 2021-07-24 RX ADMIN — HYDROCODONE BITARTRATE AND ACETAMINOPHEN 1 TABLET: 5; 325 TABLET ORAL at 05:11

## 2021-07-24 RX ADMIN — NALOXEGOL OXALATE 12.5 MG: 12.5 TABLET, FILM COATED ORAL at 09:26

## 2021-07-24 ASSESSMENT — PAIN SCALES - GENERAL
PAINLEVEL_OUTOF10: 0
PAINLEVEL_OUTOF10: 5

## 2021-07-24 NOTE — DISCHARGE SUMMARY
[facility] [address] SUMMARY  Pt Name: Nighat Nick  MRN: [id number]  YOB: 1950  Primary Care Physician: Tammie Lott MD  Admit date:  7/20/2021  5:51 AM  Discharge date:  No discharge date for patient encounter. Disposition: home  Admitting Diagnosis:   1. Polyp of sigmoid colon, unspecified type      Discharge Diagnosis:   Patient Active Problem List   Diagnosis Code    Polyp of sigmoid colon K63.5     Discharge condition:  god  Consultants:  none  Procedures/Diagnostic Test:  Robotic sigmoid colectomy   Hospital Course: Sherre Lesches originally presented to the hospital on 7/20/2021  5:51 AM after robotic sigmiod colectomy . She was started on a clear liquid diet and advanced slowly. At time of discharge, Sherre Lesches was tolerating a regular diet, having bowel movements,ambulating on her own accord and had adequate analgesia on oral pain medications, and had no signs of symptoms of complications. PHYSICAL EXAMINATION   Discharge Vitals:  height is 5' 4\" (1.626 m) and weight is 146 lb 12.8 oz (66.6 kg). Her oral temperature is 97.9 °F (36.6 °C). Her blood pressure is 145/67 (abnormal) and her pulse is 61. Her respiration is 16 and oxygen saturation is 97%.    General appearance - alert, well appearing, and in no distress  Chest - clear to ausculation  Heart - normal rate and regular rhythm  Abdomen - soft, incisional tenderness only, bowel sounds present  Neurological - motor and sensory grossly normal bilaterally  Musculoskeletal - full range of motion without pain  Extremities - peripheral pulses normal, no pedal edema, no clubbing or cyanosis  Incision: healing well, no drainage  LABS     Recent Labs     07/22/21  0634   HGB 11.8*   HCT 37.5      K 3.8      CO2 24   BUN 4*   CREATININE 0.5     DISCHARGE INSTRUCTIONS   Discharge Medications:      Medication List      START taking these medications    HYDROcodone-acetaminophen 5-325 MG per tablet  Commonly known as: [address]  Take 1 tablet by mouth every 6 hours as needed for Pain for up to 3 days. CONTINUE taking these medications    calcium carbonate 500 MG Tabs tablet  Commonly known as: OSCAL     esomeprazole Magnesium 40 MG Pack  Commonly known as: NEXIUM     famotidine 20 MG tablet  Commonly known as: PEPCID     OMEGA 3 PO     VITAMIN B 12 PO     VITAMIN D3 GUMMIES PO     WOMENS MULTI VITAMIN & MINERAL PO        STOP taking these medications    metroNIDAZOLE 500 MG tablet  Commonly known as: FLAGYL           Where to Get Your Medications      These medications were sent to [facility] [address] [place] [address]    Phone: [phone]   · HYDROcodone-acetaminophen 5-325 MG per tablet       Diet: diet as tolerated  Activity: no lifting more than 10-20 lbs for next two weeks  Wound Care: Daily and as needed  Follow-up:  in the next few weeks with [place], MD, Follow up with Trupti Mckinney in 1-2 weeks.   Time Spent for discharge: 20 minutes    Electronically signed by Scarlett Velazquez MD on 7/24/2021 at 11:20 AM

## 2021-08-11 ENCOUNTER — OFFICE VISIT (OUTPATIENT)
Dept: SURGERY | Age: 71
End: 2021-08-11

## 2021-08-11 VITALS
BODY MASS INDEX: 24.37 KG/M2 | TEMPERATURE: 96.9 F | OXYGEN SATURATION: 98 % | DIASTOLIC BLOOD PRESSURE: 70 MMHG | SYSTOLIC BLOOD PRESSURE: 128 MMHG | RESPIRATION RATE: 18 BRPM | WEIGHT: 142 LBS | HEART RATE: 67 BPM

## 2021-08-11 DIAGNOSIS — Z90.49 S/P PARTIAL RESECTION OF COLON: Primary | ICD-10-CM

## 2021-08-11 PROCEDURE — 99024 POSTOP FOLLOW-UP VISIT: CPT | Performed by: NURSE PRACTITIONER

## 2021-08-11 NOTE — PROGRESS NOTES
[address] [place] [facility] [address]  Dept: [phone]  Dept Fax: [phone]  Loc: [phone]    Visit Date: 2021    Nico Curry is a 78 y.o. female who presents today for:  Chief Complaint   Patient presents with    Post-Op Check     s/p Robotic sigmoid colectomy-2021       HPI:     NEO Byrne is a 74-year old female who presents for follow up status post robotic sigmoid colectomy 3 weeks ago with Dr. Marsha Verdugo. Pathology demonstrated flat adenoma with no signs of neoplasm. Presents with . States she is doing overall pretty well. Being treated by her PCP for a UTI. May need to see urology in the future per patient. Off the narcotics. Abdominal pain is minimal. Abdominal incisions are healing. Robotic sites are clean, dry and intact without signs of infection. Lower extraction site without any issues. Appetite is back to normal. No nausea or vomiting. Bowel function doing well. She is going daily without stool softeners. No melena or hematochezia. No fevers or chills. Urinating is improving but was having dysuria with flank pain. No hematuria. No SOB or chest pain. No lightheadedness or dizziness. No calf pain or swelling. Fatigue as expected.      Past Medical History:   Diagnosis Date    Colon stricture ([address]     polyp    Iron deficiency     sees Dr Beba Rubio      Past Surgical History:   Procedure Laterality Date     SECTION      x1    CHOLECYSTECTOMY      COLECTOMY Left 2021    ROBOTIC LEFT COLECTOMY, performed by Wanda Meek MD at [place] [address]  2021    COLONOSCOPY  2021    COLONOSCOPY  2021    GASTRIC BYPASS SURGERY      [facility]    HYSTERECTOMY      age 29 still has ovaries    TONSILLECTOMY      UPPER GASTROINTESTINAL ENDOSCOPY  2021       Family History   Problem Relation Age of Onset    Cancer Mother         lung    No Known Problems Father     Cancer Brother         bone       Social History     Tobacco Use    Smoking status: Never Smoker    Smokeless tobacco: Never Used   Substance Use Topics    Alcohol use: Yes     Comment: beer occ. Current Outpatient Medications   Medication Sig Dispense Refill    Cholecalciferol (VITAMIN D3 GUMMIES PO) Take 2 each by mouth daily      Cyanocobalamin (VITAMIN B 12 PO) Take 2 each by mouth daily      Omega-3 Fatty Acids (OMEGA 3 PO) Take 2 each by mouth daily      Multiple Vitamins-Minerals (WOMENS MULTI VITAMIN & MINERAL PO) Take 2 each by mouth daily      calcium carbonate (OSCAL) 500 MG TABS tablet Take 500 mg by mouth daily Pt unsure on dose takes 4 tablets daily      esomeprazole Magnesium (NEXIUM) 40 MG PACK Take 40 mg by mouth daily      famotidine (PEPCID) 20 MG tablet Take 20 mg by mouth daily       No current facility-administered medications for this visit. Allergies   Allergen Reactions    Other      Boniva-rash       Subjective:     Review of Systems   Constitutional: Positive for fatigue. Negative for activity change, appetite change, chills, diaphoresis, fever and unexpected weight change. HENT: Negative for congestion, dental problem, hearing loss, rhinorrhea, sinus pressure and sore throat. Eyes: Negative for photophobia, pain, discharge, itching and visual disturbance. Respiratory: Negative for apnea, cough, choking, chest tightness, shortness of breath and wheezing. Cardiovascular: Negative for chest pain, palpitations and leg swelling. Gastrointestinal: Positive for abdominal pain (minimal - incisional). Negative for abdominal distention, anal bleeding, blood in stool, constipation, diarrhea, nausea and vomiting. Endocrine: Negative. Genitourinary: Negative for decreased urine volume, difficulty urinating, dysuria, frequency and urgency.    Musculoskeletal: Negative for arthralgias, back pain, gait problem, joint swelling, myalgias and neck pain. Skin: Negative for color change, pallor, rash and wound. Allergic/Immunologic: Negative. Neurological: Negative for dizziness, tremors, weakness, numbness and headaches. Hematological: Negative. Psychiatric/Behavioral: Negative. Objective:   /70 (Site: Right Upper Arm, Position: Sitting, Cuff Size: Medium Adult)   Pulse 67   Temp 96.9 °F (36.1 °C) (Infrared)   Resp 18   Wt 142 lb (64.4 kg)   SpO2 98%   BMI 24.37 kg/m²     Physical Exam  Vitals reviewed. Constitutional:       General: She is not in acute distress. Appearance: Normal appearance. She is well-developed. She is not ill-appearing or toxic-appearing. HENT:      Head: Normocephalic and atraumatic. Right Ear: Hearing and external ear normal.      Left Ear: Hearing and external ear normal.      Nose: Nose normal.      Mouth/Throat:      Mouth: Mucous membranes are not pale, not dry and not cyanotic. Eyes:      General: Lids are normal.   Neck:      Trachea: Trachea and phonation normal.   Cardiovascular:      Rate and Rhythm: Normal rate and regular rhythm. Pulses: Normal pulses. Heart sounds: S1 normal and S2 normal.   Pulmonary:      Effort: Pulmonary effort is normal. No tachypnea, bradypnea, accessory muscle usage or respiratory distress. Breath sounds: Normal breath sounds. No decreased breath sounds, wheezing or rales. Chest:      Chest wall: No tenderness. Abdominal:      General: Bowel sounds are normal. There is no distension. Palpations: Abdomen is soft. There is no mass. Tenderness: There is no abdominal tenderness. Musculoskeletal:         General: No tenderness. Normal range of motion. Cervical back: Normal range of motion and neck supple. Skin:     General: Skin is warm and dry. Findings: No abrasion, bruising, burn, ecchymosis, erythema, laceration, lesion or rash.    Neurological:      Mental Status: She is alert and oriented to person, place, and time. Motor: No tremor, atrophy or abnormal muscle tone. Coordination: Coordination normal.      Gait: Gait normal.      Deep Tendon Reflexes: Reflexes are normal and symmetric. Psychiatric:         Speech: Speech normal.         Behavior: Behavior normal.         Thought Content: Thought content normal.       PATHOLOGY REPORT     Clinical Information: SIGMOID COLON POLYP     FINAL DIAGNOSIS:   Sigmoid colon polyp, resection:    Flat adenoma, examined margins free of dysplasia.    Negative for invasive neoplasia. Specimen:   SIGMOID COLON, STITCH ON DISTAL END     Gross Examination:   The container is labeled Caren Fonseca, sigmoid colon.  Received fresh   is a segmental resection of bowel measuring 9 cm in length.  There is   surrounding yellow adipose tissue. Cade Lava is [place] ink tattooing   identified.  The specimen is opened longitudinally revealing a pink-tan   mucosal surface.  There are no large masses. Cade Lava is a single 0.6 cm   polyp identified.  The polyp is grossly confined to the mucosal   surface.  The specimen is oriented with a suture on the distal   resection line.  The polyp is 3.5 cm from the proximal resection line. Representative sections are submitted.  Cassette #1 - Proximal   resection line; cassette #2 - distal resection line; cassette #3 -   polyp; and cassette #4 - representative bowel.  ss.  EKM/DKR:[id number]       Microscopic Examination:   The segment of bowel demonstrates an adenomatous mucosa which is   relatively flat architecture.  The examined margins are free of the   dysplasia.  No invasive neoplasia is recognized. Patient Active Problem List   Diagnosis    Polyp of sigmoid colon     Assessment:     1. Status post robotic sigmoid colectomy  2. Flat adenoma  3. UTI    Plan:     1. Abdomen benign. Incisions are healing well without signs of infection. Continue wound care as directed. 2. Pathology reviewed and discussed again.  Follow up with GI for colonoscopy in 6-8 months or sooner per their recommendations. Follows with Dr. He Pate. 3. Appetite doing well. Continue diet as tolerated. High protein supplements encouraged. 4. Bowel function doing well. Stool softeners as needed. 5. Wear abdominal binder for comfort. Off and on as needed throughout the day. 6. Off narcotics. Tylenol as needed for discomfort. 7. Continue antibiotics for UTI. Possible urology referral as needed. Following with PCP for this. 8. Lifting/activity restrictions discussed with patient. Questions answered. 9. Follow up in 2-3 weeks. Signs and symptoms reviewed with patient that would be concerning and need her to return to office for re-evaluation. Patient states she will call if she has questions or concerns.       Electronically signed by HIWOT Segura CNP on 8/12/2021 at 6:42 AM

## 2021-08-12 ASSESSMENT — ENCOUNTER SYMPTOMS
ALLERGIC/IMMUNOLOGIC NEGATIVE: 1
BACK PAIN: 0
DIARRHEA: 0
EYE DISCHARGE: 0
EYE ITCHING: 0
ANAL BLEEDING: 0
CHOKING: 0
ABDOMINAL DISTENTION: 0
CONSTIPATION: 0
COUGH: 0
VOMITING: 0
NAUSEA: 0
SORE THROAT: 0
COLOR CHANGE: 0
APNEA: 0
ABDOMINAL PAIN: 1
RHINORRHEA: 0
SHORTNESS OF BREATH: 0
PHOTOPHOBIA: 0
BLOOD IN STOOL: 0
CHEST TIGHTNESS: 0
WHEEZING: 0
SINUS PRESSURE: 0
EYE PAIN: 0

## 2021-08-31 ENCOUNTER — OFFICE VISIT (OUTPATIENT)
Dept: SURGERY | Age: 71
End: 2021-08-31

## 2021-08-31 VITALS
RESPIRATION RATE: 18 BRPM | BODY MASS INDEX: 24.72 KG/M2 | SYSTOLIC BLOOD PRESSURE: 114 MMHG | WEIGHT: 144 LBS | HEART RATE: 64 BPM | DIASTOLIC BLOOD PRESSURE: 64 MMHG | TEMPERATURE: 96.9 F | OXYGEN SATURATION: 100 %

## 2021-08-31 DIAGNOSIS — Z90.49 S/P PARTIAL RESECTION OF COLON: ICD-10-CM

## 2021-08-31 DIAGNOSIS — N39.3 STRESS INCONTINENCE OF URINE: Primary | ICD-10-CM

## 2021-08-31 DIAGNOSIS — N39.0 ACUTE UTI: ICD-10-CM

## 2021-08-31 PROCEDURE — 99024 POSTOP FOLLOW-UP VISIT: CPT | Performed by: NURSE PRACTITIONER

## 2021-09-02 ASSESSMENT — ENCOUNTER SYMPTOMS
ALLERGIC/IMMUNOLOGIC NEGATIVE: 1
SHORTNESS OF BREATH: 0
DIARRHEA: 0
CHEST TIGHTNESS: 0
NAUSEA: 0
COUGH: 0
CONSTIPATION: 0
APNEA: 0
RHINORRHEA: 0
ANAL BLEEDING: 0
BACK PAIN: 0
ABDOMINAL PAIN: 1
WHEEZING: 0
VOMITING: 0
SORE THROAT: 0
PHOTOPHOBIA: 0
EYE DISCHARGE: 0
CHOKING: 0
EYE PAIN: 0
SINUS PRESSURE: 0
COLOR CHANGE: 0
ABDOMINAL DISTENTION: 0
EYE ITCHING: 0
BLOOD IN STOOL: 0

## 2021-09-02 NOTE — PROGRESS NOTES
[address] [place] [facility] [address]  Dept: [phone]  Dept Fax: [phone]  Loc: [phone]    Visit Date: 2021    Roshan Knapp is a 78 y.o. female who presents today for:  Chief Complaint   Patient presents with    Post-Op Check     s/p Robotic sigmoid colectomy-2021 Last seen in the office on 2021        HPI:     NEO    Ba Guerrero is a 74-year old female who presents for follow up status post robotic sigmoid colectomy 6 weeks ago with Dr. Khang Vaughn. Pathology demonstrated flat adenoma with no signs of neoplasm. Presents with . Continues to do well. Denies any abdominal pain. Abdominal incisions are without signs of infection and healed well. Appetite is back to normal. No nausea or vomiting. Bowel function doing well. She is going daily without stool softeners. No melena or hematochezia. No fevers or chills. No SOB or chest pain. No lightheadedness or dizziness. No calf pain or swelling. Fatigue improving. Feels really well from surgery. Was recently treated for UTI per PCP and completed antibiotics. States she still feels pressure and frequency. Also has history of kidney stones with flank pain and some urinary incontinence. Has never seen urology but would like a referral if possible.      Past Medical History:   Diagnosis Date    Colon stricture ([address]     polyp    Iron deficiency     sees Dr Leonie Cuevas      Past Surgical History:   Procedure Laterality Date     SECTION      x1    CHOLECYSTECTOMY      COLECTOMY Left 2021    ROBOTIC LEFT COLECTOMY, performed by Shakila Becerra MD at [place] [address]  2021    COLONOSCOPY  2021    COLONOSCOPY  2021    [facility]GERY      [facility]    HYSTERECTOMY      age 29 still has ovaries    TONSILLECTOMY      UPPER GASTROINTESTINAL ENDOSCOPY  2021       Family History   Problem Relation Age of Onset    Cancer Mother         lung    No Known Problems Father     Cancer Brother         bone       Social History     Tobacco Use    Smoking status: Never Smoker    Smokeless tobacco: Never Used   Substance Use Topics    Alcohol use: Yes     Comment: beer occ. Current Outpatient Medications   Medication Sig Dispense Refill    Cholecalciferol (VITAMIN D3 GUMMIES PO) Take 2 each by mouth daily      Cyanocobalamin (VITAMIN B 12 PO) Take 2 each by mouth daily      Omega-3 Fatty Acids (OMEGA 3 PO) Take 2 each by mouth daily      Multiple Vitamins-Minerals (WOMENS MULTI VITAMIN & MINERAL PO) Take 2 each by mouth daily      calcium carbonate (OSCAL) 500 MG TABS tablet Take 500 mg by mouth daily Pt unsure on dose takes 4 tablets daily      esomeprazole Magnesium (NEXIUM) 40 MG PACK Take 40 mg by mouth daily      famotidine (PEPCID) 20 MG tablet Take 20 mg by mouth daily       No current facility-administered medications for this visit. Allergies   Allergen Reactions    Other      Boniva-rash       Subjective:     Review of Systems   Constitutional: Positive for fatigue. Negative for activity change, appetite change, chills, diaphoresis, fever and unexpected weight change. HENT: Negative for congestion, dental problem, hearing loss, rhinorrhea, sinus pressure and sore throat. Eyes: Negative for photophobia, pain, discharge, itching and visual disturbance. Respiratory: Negative for apnea, cough, choking, chest tightness, shortness of breath and wheezing. Cardiovascular: Negative for chest pain, palpitations and leg swelling. Gastrointestinal: Positive for abdominal pain (minimal - incisional). Negative for abdominal distention, anal bleeding, blood in stool, constipation, diarrhea, nausea and vomiting. Endocrine: Negative. Genitourinary: Positive for flank pain, frequency and urgency.  Negative for decreased urine volume, difficulty urinating, dysuria, enuresis, hematuria, vaginal bleeding, vaginal discharge and vaginal pain. Urinary incontience   Musculoskeletal: Negative for arthralgias, back pain, gait problem, joint swelling, myalgias and neck pain. Skin: Negative for color change, pallor, rash and wound. Allergic/Immunologic: Negative. Neurological: Negative for dizziness, tremors, weakness, numbness and headaches. Hematological: Negative. Psychiatric/Behavioral: Negative. Objective:   /64 (Site: Right Upper Arm, Position: Sitting, Cuff Size: Medium Adult)   Pulse 64   Temp 96.9 °F (36.1 °C) (Infrared)   Resp 18   Wt 144 lb (65.3 kg)   SpO2 100%   BMI 24.72 kg/m²     Physical Exam  Vitals reviewed. Constitutional:       General: She is not in acute distress. Appearance: Normal appearance. She is well-developed. She is not ill-appearing or toxic-appearing. HENT:      Head: Normocephalic and atraumatic. Right Ear: Hearing and external ear normal.      Left Ear: Hearing and external ear normal.      Nose: Nose normal.      Mouth/Throat:      Mouth: Mucous membranes are not pale, not dry and not cyanotic. Eyes:      General: Lids are normal.   Neck:      Trachea: Trachea and phonation normal.   Cardiovascular:      Rate and Rhythm: Normal rate and regular rhythm. Pulses: Normal pulses. Heart sounds: S1 normal and S2 normal.   Pulmonary:      Effort: Pulmonary effort is normal. No tachypnea, bradypnea, accessory muscle usage or respiratory distress. Breath sounds: Normal breath sounds. No decreased breath sounds, wheezing or rales. Chest:      Chest wall: No tenderness. Abdominal:      General: Bowel sounds are normal. There is no distension. Palpations: Abdomen is soft. There is no mass. Tenderness: There is no abdominal tenderness. There is no right CVA tenderness or left CVA tenderness. Musculoskeletal:         General: No tenderness. Normal range of motion.       Cervical back: Normal range of motion and neck supple. Skin:     General: Skin is warm and dry. Findings: No abrasion, bruising, burn, ecchymosis, erythema, laceration, lesion or rash. Neurological:      Mental Status: She is alert and oriented to person, place, and time. Motor: No tremor, atrophy or abnormal muscle tone. Coordination: Coordination normal.      Gait: Gait normal.      Deep Tendon Reflexes: Reflexes are normal and symmetric. Psychiatric:         Speech: Speech normal.         Behavior: Behavior normal.         Thought Content: Thought content normal.       PATHOLOGY REPORT     Clinical Information: SIGMOID COLON POLYP     FINAL DIAGNOSIS:   Sigmoid colon polyp, resection:    Flat adenoma, examined margins free of dysplasia.    Negative for invasive neoplasia. Specimen:   SIGMOID COLON, STITCH ON DISTAL END     Gross Examination:   The container is labeled Karely Stanley, sigmoid colon.  Received fresh   is a segmental resection of bowel measuring 9 cm in length.  There is   surrounding yellow adipose tissue. Marie Land is [place] ink tattooing   identified.  The specimen is opened longitudinally revealing a pink-tan   mucosal surface.  There are no large masses. Marie Land is a single 0.6 cm   polyp identified.  The polyp is grossly confined to the mucosal   surface.  The specimen is oriented with a suture on the distal   resection line.  The polyp is 3.5 cm from the proximal resection line. Representative sections are submitted.  Cassette #1 - Proximal   resection line; cassette #2 - distal resection line; cassette #3 -   polyp; and cassette #4 - representative bowel.  ss.  EKM/DKR:[id number]       Microscopic Examination:   The segment of bowel demonstrates an adenomatous mucosa which is   relatively flat architecture.  The examined margins are free of the   dysplasia.  No invasive neoplasia is recognized. Patient Active Problem List   Diagnosis    Polyp of sigmoid colon     Assessment:     1.  Status post robotic sigmoid colectomy  2. Urinary frequency and urgency  3. Flank pain  4. History of kidney stones  5. Urinary incontience    Plan:     1. Abdomen benign. Incisions are well healed. 2. Pathology benign. Patient to follow up with GI as directed. Routine colonoscopy per their recommendations. 3. Appetite and bowel function returned to normal  4. Denies any abdominal pain  5. Increase activity as tolerated  6. Referral to urology.  sees Katie Stephenson and would like to see her if possible. 7. Follow up as needed. Signs and symptoms reviewed with patient that would be concerning and need her to return to office for re-evaluation. Patient states she will call if she has questions or concerns.   8. Follow up with PCP and GI as directed      Electronically signed by HIWOT Zavala CNP on 9/2/2021 at 2:40 PM

## 2021-09-16 ENCOUNTER — OFFICE VISIT (OUTPATIENT)
Dept: UROLOGY | Age: 71
End: 2021-09-16
Payer: MEDICARE

## 2021-09-16 VITALS
HEIGHT: 64 IN | DIASTOLIC BLOOD PRESSURE: 63 MMHG | WEIGHT: 146.8 LBS | SYSTOLIC BLOOD PRESSURE: 121 MMHG | BODY MASS INDEX: 25.06 KG/M2

## 2021-09-16 DIAGNOSIS — N39.3 STRESS INCONTINENCE OF URINE: Primary | ICD-10-CM

## 2021-09-16 LAB
BILIRUBIN URINE: NEGATIVE
BLOOD URINE, POC: NEGATIVE
CHARACTER, URINE: CLEAR
COLOR, URINE: YELLOW
GLUCOSE URINE: NEGATIVE MG/DL
KETONES, URINE: ABNORMAL
LEUKOCYTE CLUMPS, URINE: NEGATIVE
NITRITE, URINE: NEGATIVE
PH, URINE: 5 (ref 5–9)
POST VOID RESIDUAL (PVR): 9 ML
PROTEIN, URINE: NEGATIVE MG/DL
SPECIFIC GRAVITY, URINE: 1.02 (ref 1–1.03)
UROBILINOGEN, URINE: 0.2 EU/DL (ref 0–1)

## 2021-09-16 PROCEDURE — 3017F COLORECTAL CA SCREEN DOC REV: CPT | Performed by: NURSE PRACTITIONER

## 2021-09-16 PROCEDURE — 4040F PNEUMOC VAC/ADMIN/RCVD: CPT | Performed by: NURSE PRACTITIONER

## 2021-09-16 PROCEDURE — G8400 PT W/DXA NO RESULTS DOC: HCPCS | Performed by: NURSE PRACTITIONER

## 2021-09-16 PROCEDURE — 1036F TOBACCO NON-USER: CPT | Performed by: NURSE PRACTITIONER

## 2021-09-16 PROCEDURE — 1123F ACP DISCUSS/DSCN MKR DOCD: CPT | Performed by: NURSE PRACTITIONER

## 2021-09-16 PROCEDURE — 51798 US URINE CAPACITY MEASURE: CPT | Performed by: NURSE PRACTITIONER

## 2021-09-16 PROCEDURE — G8427 DOCREV CUR MEDS BY ELIG CLIN: HCPCS | Performed by: NURSE PRACTITIONER

## 2021-09-16 PROCEDURE — 81003 URINALYSIS AUTO W/O SCOPE: CPT | Performed by: NURSE PRACTITIONER

## 2021-09-16 PROCEDURE — 1090F PRES/ABSN URINE INCON ASSESS: CPT | Performed by: NURSE PRACTITIONER

## 2021-09-16 PROCEDURE — 99204 OFFICE O/P NEW MOD 45 MIN: CPT | Performed by: NURSE PRACTITIONER

## 2021-09-16 PROCEDURE — G8417 CALC BMI ABV UP PARAM F/U: HCPCS | Performed by: NURSE PRACTITIONER

## 2021-09-16 RX ORDER — OXYBUTYNIN CHLORIDE 10 MG/1
10 TABLET, EXTENDED RELEASE ORAL DAILY
Qty: 30 TABLET | Refills: 5 | Status: SHIPPED | OUTPATIENT
Start: 2021-09-16 | End: 2021-10-11

## 2021-09-16 NOTE — PROGRESS NOTES
1.  o'k to continue on lower dose of Losartan at 50 mg a day    2. If Zantac does not work, then we can order omeprazole.    3. Get high dose flu shot at the pharmacy.     4. Try steroid cream for rash, if not better, see dermatology.    5. Once rash has resolved, have heart monitor done.    [address]  [address]  [facility] [address]  Dept: [phone]  Loc: [phone]  Visit Date: 2021    HPI:     Anmol Lema is a new patient who was referred here by HIWOT Coronel for urinary incontinence. HPI: Overactive bladder: urinary frequency urgency incontinence  Labs reviewed  Old records reviewed  Problem: worsening  Duration: several years  Pain: 0/10  Imaging reviewed: CT, small right sided stone  Frequency: could be every hour, gets urge to go but only little comes out  Urgency: yes  Incontinence:   - PPD: wears depends, changes 1-2 times a day. -   The patient also complains of nocturia x 2-3. Hematuria: none  Nephrolithiasis and bladder stones: right renal stone  2 UTI since May  Denies history of retention    Bladder irritants: drinks a beer a night  PVR: 9 ml      Has not tried anything for symptoms. 2 vaginal deliveries  Gastric bypass  Hysterectomy  Bladder lift in [place] pass  No leaking with laughing, coughing or sneezing. Anmol Lema is here today by herself. History gathered from patient and medical records.     Past Medical History:   Diagnosis Date    Colon stricture ([address]     polyp    Iron deficiency     sees Dr Melissa Santiago      Past Surgical History:   Procedure Laterality Date     SECTION      x1    CHOLECYSTECTOMY      COLECTOMY Left 2021    ROBOTIC LEFT COLECTOMY, performed by Chantale Oleary MD at [place] [address]  2021    COLONOSCOPY  2021    COLONOSCOPY  2021    [facility]    HYSTERECTOMY      age 29 still has ovaries    TONSILLECTOMY      UPPER GASTROINTESTINAL ENDOSCOPY  2021       Family History   Problem Relation Age of Onset    Cancer Mother         lung    No Known Problems Father     Cancer Brother         bone       Social History     Tobacco Use    Smoking status: Never Smoker    Smokeless tobacco: Never Used   Substance Use Topics    Alcohol use: Yes     Comment: beer occ. Current Outpatient Medications   Medication Sig Dispense Refill    oxybutynin (DITROPAN XL) 10 MG extended release tablet Take 1 tablet by mouth daily 30 tablet 5    Cholecalciferol (VITAMIN D3 GUMMIES PO) Take 2 each by mouth daily      Cyanocobalamin (VITAMIN B 12 PO) Take 2 each by mouth daily      Omega-3 Fatty Acids (OMEGA 3 PO) Take 2 each by mouth daily      Multiple Vitamins-Minerals (WOMENS MULTI VITAMIN & MINERAL PO) Take 2 each by mouth daily      calcium carbonate (OSCAL) 500 MG TABS tablet Take 500 mg by mouth daily Pt unsure on dose takes 4 tablets daily      esomeprazole Magnesium (NEXIUM) 40 MG PACK Take 40 mg by mouth daily      famotidine (PEPCID) 20 MG tablet Take 20 mg by mouth daily       No current facility-administered medications for this visit. Allergies   Allergen Reactions    Other      Boniva-rash       ROS:  Constitutional: Negative for chills, fatigue, fever, or weight loss. Eyes: Denies reported visual changes. ENT: Denies headache, difficulty swallowing, nose bleeds, ringing in ears, or earaches. Cardiovascular: Negative for chest pain, palpitations, tachycardia or edema. Respiratory: Denies cough or SOB. GI:The patient denies abdominal or flank pain, anorexia, nausea or vomiting. : See HPI  Musculoskeletal: Patient denies low back pain or painful or reduced ROM of the back or extremities. Neurological: The patient denies any symptoms of neurological impairment or TIA's; no history of stroke. Lymphatic: Denies swollen glands in neck, axillary or inguinal areas. Psychiatric: Denies anxiety or depression. Skin: Denies rash or lesions. The remainder of the complete ROS is negative    PHYSICAL EXAM:  VITALS:  /63   Ht 5' 4\" (1.626 m)   Wt 146 lb 12.8 oz (66.6 kg)   BMI 25.20 kg/m² .     Constitutional:    Alert and oriented times 3, no acute distress and cooperative to examination with appropriate mood and affect. HEENT:   Head:         Normocephalic and atraumatic. Mouth/Throat:          Mucous membranes are normal.   Eyes:         EOM are normal. No scleral icterus. Nose:    The external appearance of the nose is normal  Ears: The ears appear normal to external inspection   Cardiovascular:        Normal rate, regular rhythm, S1 S2 heart sounds. Pulmonary/Chest:       Normal respiratory rate and rhthym. No use of accessory muscles. Abdominal:          Soft. No tenderness. Active bowel sounds  Musculoskeletal:    Normal range of motion. She exhibits no edema or tenderness of lower extremities. Extremities:    No cyanosis, clubbing, or edema present. Neurological:    Alert and oriented. DATA:  CBC:   Lab Results   Component Value Date    HGB 11.8 07/22/2021    HCT 37.5 07/22/2021     BMP:    Lab Results   Component Value Date     07/22/2021    K 3.8 07/22/2021    K 4.0 07/21/2021     07/22/2021    CO2 24 07/22/2021    BUN 4 07/22/2021    CREATININE 0.5 07/22/2021    CALCIUM 9.0 07/22/2021    LABGLOM >90 07/22/2021    GLUCOSE 91 07/22/2021     BUN/Creatinine:    Lab Results   Component Value Date    BUN 4 07/22/2021    CREATININE 0.5 07/22/2021     Magnesium:  No results found for: MG  Phosphorus:  No results found for: PHOS  PT/INR:  No results found for: PROTIME, INR  U/A:    Lab Results   Component Value Date    COLORU Yellow 09/16/2021    COLORU YELLOW 07/20/2021    PHUR 5.5 07/20/2021    LABCAST NONE SEEN 07/20/2021    LABCAST NONE SEEN 07/20/2021    WBCUA 15-25 07/20/2021    RBCUA NONE SEEN 07/20/2021    YEAST NONE SEEN 07/20/2021    BACTERIA NONE SEEN 07/20/2021    SPECGRAV 1.009 07/20/2021    LEUKOCYTESUR SMALL 07/20/2021    UROBILINOGEN 0.20 09/16/2021    BILIRUBINUR Negative 09/16/2021    BLOODU Negative 09/16/2021    BLOODU NEGATIVE 07/20/2021    GLUCOSEU Negative 09/16/2021       Imaging:    The patient has had a CT Stone Protocol which I have reviewed along with its accompanying report. The study demonstrates :      Assessment & Plan:         Diagnosis Orders   1. Stress incontinence of urine  POCT Urinalysis No Micro (Auto)    poct post void residual       All referral documents were reviewed. OAB with urge incontinence  Right renal stone    Discussed OAB pathway. Will trial Oxybutynin, discussed side effects. Offered PFT, pt declined at this time. Avoid bladder irritants  Timed voiding. FU 4-6 weeks.        Electronically signed by HIWOT Lawson CNP on 9/16/2021 at 11:57 AM

## 2021-10-11 RX ORDER — OXYBUTYNIN CHLORIDE 10 MG/1
TABLET, EXTENDED RELEASE ORAL
Qty: 30 TABLET | Refills: 5 | Status: SHIPPED | OUTPATIENT
Start: 2021-10-11

## 2021-10-11 NOTE — TELEPHONE ENCOUNTER
Bruna Lema called requesting a refill on the following medications:  Requested Prescriptions     Pending Prescriptions Disp Refills    oxybutynin (DITROPAN-XL) 10 MG extended release tablet [Pharmacy Med Name: OXYBUTYNIN CL ER 10 MG TABLET] 30 tablet 5     Sig: TAKE 1 TABLET BY MOUTH [id number] [facility] verified:  .pv      Date of last visit: 09/16/2021  Date of next visit (if applicable): 80/32/4028

## (undated) DEVICE — COVER EQUIP STEEP TREND EZ CLN UP WTRPRF DISP FOR STD SZ

## (undated) DEVICE — CANNULA SEAL

## (undated) DEVICE — BASIC SINGLE BASIN BTC-LF: Brand: MEDLINE INDUSTRIES, INC.

## (undated) DEVICE — SUTURE VCRL + SZ 0 L18IN ABSRB TIE VCP106G

## (undated) DEVICE — TROCAR: Brand: KII FIOS FIRST ENTRY

## (undated) DEVICE — TUBING INSUFFLATOR HEAT HUMIDIFIED SMK EVAC SET PNEUMOCLEAR

## (undated) DEVICE — STAPLER EXT 65MM S STL AUTO DISP PURSTRING

## (undated) DEVICE — PACK-MAJOR

## (undated) DEVICE — ARM DRAPE

## (undated) DEVICE — PACK PROCEDURE SURG SET UP SRMC

## (undated) DEVICE — PACK PROC LAP II AURORA

## (undated) DEVICE — SUTURE NOVAFIL SZ 1 L30IN NONABSORBABLE BLU GS-25 1/2 CIR 8886446571

## (undated) DEVICE — HYPODERMIC SAFETY NEEDLE: Brand: MAGELLAN

## (undated) DEVICE — SYRINGE CATH TIP 50ML

## (undated) DEVICE — STAPLER SKIN L440MM 32MM LNG 12 FIRING B FRM PWR + GRIPPING

## (undated) DEVICE — TIP COVER ACCESSORY

## (undated) DEVICE — COLUMN DRAPE

## (undated) DEVICE — 3M™ STERI-STRIP™ COMPOUND BENZOIN TINCTURE 40 BAGS/CARTON 4 CARTONS/CASE C1544: Brand: 3M™ STERI-STRIP™

## (undated) DEVICE — GLOVE ORANGE PI 7   MSG9070

## (undated) DEVICE — SPONGE LAP W18XL18IN WHT COT 4 PLY FLD STRUNG RADPQ DISP ST

## (undated) DEVICE — PENCIL SMK EVAC ALL IN 1 DSGN ENH VISIBILITY IMPROVED AIR

## (undated) DEVICE — SEALANT TISS 10 CC FIBRIN VISTASEAL

## (undated) DEVICE — SUTURE VCRL SZ 2-0 L27IN ABSRB UD L26MM SH 1/2 CIR J417H

## (undated) DEVICE — APPLICATOR MEDICATED 26 CC SOLUTION HI LT ORNG CHLORAPREP

## (undated) DEVICE — TTL1LYR 16FR10ML 100%SIL TMPST TR: Brand: MEDLINE

## (undated) DEVICE — SOLUTION ANTIFOG VIS SYS CLEARIFY LAPSCP

## (undated) DEVICE — SUTURE PROL SZ 2-0 L36IN NONABSORBABLE BLU SH L26MM 1/2 CIR 8523H

## (undated) DEVICE — DRAPE,ROBOTICS,STERILE: Brand: MEDLINE

## (undated) DEVICE — 35 ML SYRINGE LUER-LOCK TIP: Brand: MONOJECT

## (undated) DEVICE — PUMP SUC IRR TBNG L10FT W/ HNDPC ASSEMB STRYKEFLOW 2

## (undated) DEVICE — GLOVE ORANGE PI 8   MSG9080

## (undated) DEVICE — SUTURE VCRL + SZ 4-0 L27IN ABSRB WHT FS-2 3/8 CIR REV CUT VCP422H

## (undated) DEVICE — RELOAD STPL H4.1X2MM DIA60MM THCK TISS GRN 6 ROW PWR GST B

## (undated) DEVICE — SYRINGE MED 10ML LUERLOCK TIP W/O SFTY DISP

## (undated) DEVICE — GOWN,SIRUS,NON REINFRCD,LARGE,SET IN SL: Brand: MEDLINE

## (undated) DEVICE — SUTURE VCRL + SZ 3-0 L27IN ABSRB UD L26MM SH 1/2 CIR VCP416H

## (undated) DEVICE — STRIP,CLOSURE,WOUND,MEDI-STRIP,1/2X4: Brand: MEDLINE

## (undated) DEVICE — WOUND RETRACTOR AND PROTECTOR: Brand: ALEXIS O WOUND PROTECTOR-RETRACTOR

## (undated) DEVICE — VESSEL SEALER EXTEND: Brand: ENDOWRIST

## (undated) DEVICE — INTENDED FOR TISSUE SEPARATION, AND OTHER PROCEDURES THAT REQUIRE A SHARP SURGICAL BLADE TO PUNCTURE OR CUT.: Brand: BARD-PARKER ® CARBON RIB-BACK BLADES

## (undated) DEVICE — INSUFFLATION NEEDLE TO ESTABLISH PNEUMOPERITONEUM.: Brand: INSUFFLATION NEEDLE

## (undated) DEVICE — ELECTRO LUBE IS A SINGLE PATIENT USE DEVICE THAT IS INTENDED TO BE USED ON ELECTROSURGICAL ELECTRODES TO REDUCE STICKING.: Brand: KEY SURGICAL ELECTRO LUBE

## (undated) DEVICE — BLADELESS OBTURATOR: Brand: WECK VISTA